# Patient Record
Sex: FEMALE | Race: WHITE | NOT HISPANIC OR LATINO | Employment: OTHER | URBAN - NONMETROPOLITAN AREA
[De-identification: names, ages, dates, MRNs, and addresses within clinical notes are randomized per-mention and may not be internally consistent; named-entity substitution may affect disease eponyms.]

---

## 2018-11-13 ENCOUNTER — DOCUMENTATION (OUTPATIENT)
Dept: CARDIAC REHAB | Facility: HOSPITAL | Age: 78
End: 2018-11-13

## 2019-09-04 ENCOUNTER — CONSULT (OUTPATIENT)
Dept: SURGERY | Facility: CLINIC | Age: 79
End: 2019-09-04

## 2019-09-04 VITALS
HEIGHT: 65 IN | BODY MASS INDEX: 29.66 KG/M2 | WEIGHT: 178 LBS | HEART RATE: 60 BPM | SYSTOLIC BLOOD PRESSURE: 132 MMHG | TEMPERATURE: 97.9 F | DIASTOLIC BLOOD PRESSURE: 70 MMHG

## 2019-09-04 DIAGNOSIS — C44.90 SKIN CANCER: Primary | ICD-10-CM

## 2019-09-04 PROBLEM — H90.A21 SENSORINEURAL HEARING LOSS (SNHL) OF RIGHT EAR WITH RESTRICTED HEARING OF LEFT EAR: Status: ACTIVE | Noted: 2018-04-27

## 2019-09-04 PROBLEM — I24.89: Status: ACTIVE | Noted: 2019-02-28

## 2019-09-04 PROBLEM — N13.2 HYDRONEPHROSIS WITH URINARY OBSTRUCTION DUE TO URETERAL CALCULUS: Status: ACTIVE | Noted: 2018-06-16

## 2019-09-04 PROBLEM — L72.0 MILIA: Status: ACTIVE | Noted: 2019-01-03

## 2019-09-04 PROBLEM — I24.8: Status: ACTIVE | Noted: 2019-02-28

## 2019-09-04 PROBLEM — Z86.007 HISTORY OF SQUAMOUS CELL CARCINOMA IN SITU: Status: ACTIVE | Noted: 2019-02-21

## 2019-09-04 PROBLEM — I21.4 NSTEMI (NON-ST ELEVATED MYOCARDIAL INFARCTION) (HCC): Status: ACTIVE | Noted: 2018-10-30

## 2019-09-04 PROBLEM — I25.9 CHRONIC ISCHEMIC HEART DISEASE, UNSPECIFIED: Status: ACTIVE | Noted: 2019-02-28

## 2019-09-04 PROBLEM — I45.5 OTHER SPECIFIED HEART BLOCK: Status: ACTIVE | Noted: 2019-02-28

## 2019-09-04 PROBLEM — Z95.1 PRESENCE OF AORTOCORONARY BYPASS GRAFT: Status: ACTIVE | Noted: 2019-02-28

## 2019-09-04 PROBLEM — Z95.0 PRESENCE OF CARDIAC PACEMAKER: Status: ACTIVE | Noted: 2018-12-03

## 2019-09-04 PROBLEM — L28.1 PICKER'S PAPULE: Status: ACTIVE | Noted: 2019-01-03

## 2019-09-04 PROBLEM — R59.0 MEDIASTINAL ADENOPATHY: Status: ACTIVE | Noted: 2018-06-17

## 2019-09-04 PROCEDURE — 99203 OFFICE O/P NEW LOW 30 MIN: CPT | Performed by: SURGERY

## 2019-09-04 RX ORDER — METOPROLOL SUCCINATE 50 MG/1
TABLET, EXTENDED RELEASE ORAL
COMMUNITY
Start: 2019-08-14

## 2019-09-04 RX ORDER — OMEPRAZOLE 20 MG/1
20 CAPSULE, DELAYED RELEASE ORAL
Refills: 0 | COMMUNITY
Start: 2019-08-14

## 2019-09-04 RX ORDER — INSULIN ASPART 100 [IU]/ML
INJECTION, SOLUTION INTRAVENOUS; SUBCUTANEOUS
Refills: 1 | COMMUNITY
Start: 2019-08-14

## 2019-09-04 RX ORDER — LOSARTAN POTASSIUM 50 MG/1
TABLET ORAL
Refills: 0 | COMMUNITY
Start: 2019-08-14

## 2019-09-04 RX ORDER — TICAGRELOR 90 MG/1
90 TABLET ORAL 2 TIMES DAILY
Refills: 1 | COMMUNITY
Start: 2019-08-14

## 2019-09-04 RX ORDER — CITALOPRAM 40 MG/1
TABLET ORAL
Refills: 2 | COMMUNITY
Start: 2019-08-14

## 2019-09-04 RX ORDER — ASPIRIN 81 MG/1
81 TABLET, CHEWABLE ORAL DAILY
COMMUNITY

## 2019-09-04 RX ORDER — NITROGLYCERIN 0.4 MG/1
0.4 TABLET SUBLINGUAL
COMMUNITY
Start: 2017-03-02

## 2019-09-04 RX ORDER — DOXYCYCLINE 100 MG/1
CAPSULE ORAL
Refills: 0 | COMMUNITY
Start: 2019-08-28 | End: 2022-07-21

## 2019-09-04 RX ORDER — ISOSORBIDE MONONITRATE 60 MG/1
60 TABLET, EXTENDED RELEASE ORAL EVERY MORNING
Refills: 11 | COMMUNITY
Start: 2019-08-16

## 2019-09-04 RX ORDER — INSULIN DETEMIR 100 [IU]/ML
70 INJECTION, SOLUTION SUBCUTANEOUS
Refills: 1 | COMMUNITY
Start: 2019-08-14

## 2019-09-04 RX ORDER — ATORVASTATIN CALCIUM 40 MG/1
TABLET, FILM COATED ORAL
Refills: 1 | COMMUNITY
Start: 2019-08-14

## 2019-09-04 RX ORDER — ROPINIROLE 0.5 MG/1
TABLET, FILM COATED ORAL
Refills: 4 | COMMUNITY
Start: 2019-07-17

## 2019-09-04 RX ORDER — METHIMAZOLE 5 MG/1
TABLET ORAL
COMMUNITY
Start: 2019-08-14

## 2019-09-04 NOTE — PROGRESS NOTES
Subjective   Nicole Webb is a 78 y.o. 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  Patient also states she underwent biopsies of lesions on the inside of her face but there is no pathology report provided.  Patient denies having skin cancers in the past.  She is on Brilinta for history of stent placement in her heart.    Review of Systems   Constitutional: Negative.    Eyes: Negative.    Respiratory: Negative.    Cardiovascular: Negative.    Gastrointestinal: Negative.    Endocrine: Negative.    Genitourinary: Negative.    Musculoskeletal: Positive for arthralgias and back pain.   Skin:        Squamous cell anterior neck & Lesion forehead   Allergic/Immunologic: Negative.    Neurological: Positive for numbness and headaches.        Numbness & Tingling Bilateral Feet   Hematological: Negative.    Psychiatric/Behavioral: Negative.      Past Medical History:   Diagnosis Date   • CHF (congestive heart failure) (CMS/HCC)    • Diabetes (CMS/HCC)    • Hypertension      Past Surgical History:   Procedure Laterality Date   • APPENDECTOMY     • CHOLECYSTECTOMY OPEN     • CORONARY ANGIOPLASTY     • CORONARY ARTERY BYPASS GRAFT     • KIDNEY STONE SURGERY     • PACEMAKER IMPLANTATION     • SHOULDER SURGERY Right      Family History   Problem Relation Age of Onset   • Heart disease Father    • Diabetes Daughter      Social History     Socioeconomic History   • Marital status:      Spouse name: Not on file   • Number of children: Not on file   • Years of education: Not on file   • Highest education level: Not on file   Tobacco Use   • Smoking  status: Former Smoker     Last attempt to quit: 1979     Years since quittin.0   • Smokeless tobacco: Never Used   Substance and Sexual Activity   • Alcohol use: No     Frequency: Never     Allergies   Allergen Reactions   • Codeine Diarrhea and Nausea And Vomiting   • Lisinopril Cough     Vitals:    19 0954   BP: 132/70   Pulse: 60   Temp: 97.9 °F (36.6 °C)       Home Medications:  Prior to Admission medications    Medication Sig Start Date End Date Taking? Authorizing Provider   aspirin 81 MG chewable tablet Chew 81 mg Daily.   Yes Baldo Curry MD   atorvastatin (LIPITOR) 40 MG tablet TAKE ONE TABLET BY MOUTH ONCE DAILY 19  Yes Baldo Curry MD   BRILINTA 90 MG tablet tablet Take 90 mg by mouth 2 (Two) Times a Day. 19  Yes Baldo Curry MD   citalopram (CeleXA) 40 MG tablet TAKE ONE TABLET BY MOUTH ONCE DAILY 19  Yes Baldo Curyr MD   doxycycline (MONODOX) 100 MG capsule TAKE ONE CAPSULE BY MOUTH TWO TIMES A DAY FOR 21 DAYS 19  Yes Baldo Curry MD   isosorbide mononitrate (IMDUR) 60 MG 24 hr tablet Take 60 mg by mouth Every Morning. 19  Yes Baldo Curry MD   LEVEMIR FLEXTOUCH 100 UNIT/ML injection Inject 70 Units under the skin into the appropriate area as directed every night at bedtime. 19  Yes Baldo Curry MD   losartan (COZAAR) 50 MG tablet TAKE ONE TABLET BY MOUTH ONCE DAILY 19  Yes ProviderBaldo MD   metFORMIN (GLUCOPHAGE) 500 MG tablet  19  Yes ProviderBaldo MD   methIMAzole (TAPAZOLE) 5 MG tablet  19  Yes ProviderBaldo MD   metoprolol succinate XL (TOPROL-XL) 50 MG 24 hr tablet  19  Yes Baldo Curry MD   nitroglycerin (NITROSTAT) 0.4 MG SL tablet Place 0.4 mg under the tongue. 3/2/17  Yes Badlo Curry MD   NOVOLOG FLEXPEN 100 UNIT/ML solution pen-injector sc pen INJECT 20 UNITS SUBCUTANEOUSLY BEFORE MEALS 19  Yes ProviderBaldo  MD   omeprazole (priLOSEC) 20 MG capsule Take 20 mg by mouth Before Breakfast. 8/14/19  Yes Provider, MD Baldo   rOPINIRole (REQUIP) 0.5 MG tablet TAKE ONE TABLET BY MOUTH 1 TO 3 HOURS BEFORE BEDTIME 7/17/19  Yes Provider, Historical, MD       Objective   Physical Exam   Constitutional: She is oriented to person, place, and time. She appears well-developed and well-nourished. No distress.   HENT:   Head: Normocephalic and atraumatic.   Nose: Nose normal.   Eyes: Conjunctivae are normal.   Neck: Normal range of motion. No tracheal deviation present. No thyromegaly present.   Cardiovascular: Normal rate, regular rhythm and normal heart sounds.   No murmur heard.  Pulmonary/Chest: Effort normal and breath sounds normal. No respiratory distress. She has no wheezes. She has no rales. She exhibits no tenderness.   Abdominal: Soft. She exhibits no distension. There is no tenderness. There is no rebound and no guarding. No hernia.   Musculoskeletal: She exhibits no tenderness or deformity.   Neurological: She is alert and oriented to person, place, and time.   Skin: Skin is warm and dry. No rash noted.   Psychiatric: She has a normal mood and affect. Her behavior is normal. Judgment and thought content normal.   Vitals reviewed.  6 x 9 mm wound consistent with a shave biopsy midportion of her forehead appears to be healing and a 7 mm diameter superficial wound consistent with a shave biopsy on left lower anterior neck.  No redness or fluctuance associated either site.  No palpable adenopathy.  Smaller biopsy sites on each side of her face.    Assessment/Plan   According to the path report the forehead lesion did not think needs to have surgery at this point.  The lower anterior neck lesion should be reexcised and we discussed doing that in the office of the operating room I think she can stay on her Brilinta for this procedure.  Regarding the other 2 lesions she is going to be checking back with the dermatologist but  I do not have the pathology reports from the office.  There was some question that the patient was told that she had a basal cell cancer on her forehead but that is not what the pathology she informs us.  We will get her in to see dermatology and will also get her set up to have the lower anterior neck squamous cell carcinoma in situ lesion reexcised.      The encounter diagnosis was Skin cancer.                     This document has been electronically signed by Jose Orozco MD on September 4, 2019 1:49 PM

## 2019-09-04 NOTE — PATIENT INSTRUCTIONS

## 2019-11-20 ENCOUNTER — OFFICE VISIT (OUTPATIENT)
Dept: OTOLARYNGOLOGY | Facility: CLINIC | Age: 79
End: 2019-11-20

## 2019-11-20 VITALS — HEIGHT: 65 IN | OXYGEN SATURATION: 96 % | BODY MASS INDEX: 30.09 KG/M2 | WEIGHT: 180.6 LBS

## 2019-11-20 DIAGNOSIS — L28.0 NEURODERMATITIS: ICD-10-CM

## 2019-11-20 DIAGNOSIS — D04.9 SQUAMOUS CELL CARCINOMA IN SITU (SCCIS) OF SKIN: Primary | ICD-10-CM

## 2019-11-20 PROCEDURE — 17261 DSTRJ MAL LES T/A/L .6-1.0CM: CPT | Performed by: OTOLARYNGOLOGY

## 2019-11-20 PROCEDURE — 99203 OFFICE O/P NEW LOW 30 MIN: CPT | Performed by: OTOLARYNGOLOGY

## 2019-11-20 RX ORDER — INFLUENZA A VIRUS A/MICHIGAN/45/2015 X-275 (H1N1) ANTIGEN (FORMALDEHYDE INACTIVATED), INFLUENZA A VIRUS A/SINGAPORE/INFIMH-16-0019/2016 IVR-186 (H3N2) ANTIGEN (FORMALDEHYDE INACTIVATED), AND INFLUENZA B VIRUS B/MARYLAND/15/2016 BX-69A (A B/COLORADO/6/2017-LIKE VIRUS) ANTIGEN (FORMALDEHYDE INACTIVATED) 60; 60; 60 UG/.5ML; UG/.5ML; UG/.5ML
INJECTION, SUSPENSION INTRAMUSCULAR
Refills: 0 | COMMUNITY
Start: 2019-10-17

## 2019-11-20 RX ORDER — GABAPENTIN 300 MG/1
300 CAPSULE ORAL
COMMUNITY
Start: 2019-11-15 | End: 2020-12-16

## 2019-11-20 RX ORDER — FLASH GLUCOSE SENSOR
1 KIT MISCELLANEOUS
COMMUNITY
Start: 2019-10-15 | End: 2020-11-16

## 2019-11-20 NOTE — PROGRESS NOTES
"Subjective   Nicole Webb is a 79 y.o. female.   Consultation from MAUREEN Cam  History of Present Illness   Patient had a lesion on her anterior neck that is been present for a few months.  Was not painful or bleeding.  She has chronically on antiplatelet therapy with Brilinta.  Underwent shave biopsy.  Pathology report is available and was consistent with squamous cell carcinoma in situ.  Patient also has a lesion of her mid forehead that is been present several months.  She says it started when a \"hard shell bug\" flew into her forehead.  She admits to digitally traumatizing it but states it just will not heal.      The following portions of the patient's history were reviewed and updated as appropriate: allergies, current medications, past family history, past medical history, past social history, past surgical history and problem list.      Nicole Webb reports that she quit smoking about 40 years ago. She has never used smokeless tobacco. She reports that she does not drink alcohol.  Patient is not a tobacco user and has not been counseled for use of tobacco products    Family History   Problem Relation Age of Onset   • Heart disease Father    • Diabetes Daughter        Allergies   Allergen Reactions   • Codeine Diarrhea and Nausea And Vomiting   • Lisinopril Cough         Current Outpatient Medications:   •  aspirin 81 MG chewable tablet, Chew 81 mg Daily., Disp: , Rfl:   •  atorvastatin (LIPITOR) 40 MG tablet, TAKE ONE TABLET BY MOUTH ONCE DAILY, Disp: , Rfl: 1  •  BRILINTA 90 MG tablet tablet, Take 90 mg by mouth 2 (Two) Times a Day., Disp: , Rfl: 1  •  citalopram (CeleXA) 40 MG tablet, TAKE ONE TABLET BY MOUTH ONCE DAILY, Disp: , Rfl: 2  •  Continuous Blood Gluc Sensor (FREESTYLE GERALDO 14 DAY SENSOR) [facility], 1 each., Disp: , Rfl:   •  doxycycline (MONODOX) 100 MG capsule, TAKE ONE CAPSULE BY MOUTH TWO TIMES A DAY FOR 21 DAYS, Disp: , Rfl: 0  •  gabapentin (NEURONTIN) 300 MG capsule, " Take 300 mg by mouth., Disp: , Rfl:   •  isosorbide mononitrate (IMDUR) 60 MG 24 hr tablet, Take 60 mg by mouth Every Morning., Disp: , Rfl: 11  •  LEVEMIR FLEXTOUCH 100 UNIT/ML injection, Inject 70 Units under the skin into the appropriate area as directed every night at bedtime., Disp: , Rfl: 1  •  losartan (COZAAR) 50 MG tablet, TAKE ONE TABLET BY MOUTH ONCE DAILY, Disp: , Rfl: 0  •  metFORMIN (GLUCOPHAGE) 500 MG tablet, , Disp: , Rfl:   •  methIMAzole (TAPAZOLE) 5 MG tablet, , Disp: , Rfl:   •  metoprolol succinate XL (TOPROL-XL) 50 MG 24 hr tablet, , Disp: , Rfl:   •  nitroglycerin (NITROSTAT) 0.4 MG SL tablet, Place 0.4 mg under the tongue., Disp: , Rfl:   •  NOVOLOG FLEXPEN 100 UNIT/ML solution pen-injector sc pen, INJECT 20 UNITS SUBCUTANEOUSLY BEFORE MEALS, Disp: , Rfl: 1  •  omeprazole (priLOSEC) 20 MG capsule, Take 20 mg by mouth Before Breakfast., Disp: , Rfl: 0  •  rOPINIRole (REQUIP) 0.5 MG tablet, TAKE ONE TABLET BY MOUTH 1 TO 3 HOURS BEFORE BEDTIME, Disp: , Rfl: 4  •  FLUZONE HIGH-DOSE 0.5 ML suspension prefilled syringe injection, TO BE ADMINISTERED BY PHARMACIST FOR IMMUNIZATION, Disp: , Rfl: 0  •  mupirocin (BACTROBAN) 2 % ointment, Apply to forehead lesion and cover with bandage Apply to neck lesion twice a day, Disp: 15 g, Rfl: 1    Past Medical History:   Diagnosis Date   • CHF (congestive heart failure) (CMS/HCC)    • Diabetes (CMS/HCC)    • Hypertension    • Skin cancer        Past Surgical History:   Procedure Laterality Date   • APPENDECTOMY     • CHOLECYSTECTOMY OPEN     • CORONARY ANGIOPLASTY     • CORONARY ARTERY BYPASS GRAFT     • KIDNEY STONE SURGERY     • PACEMAKER IMPLANTATION     • SHOULDER SURGERY Right          Review of Systems   HENT: Positive for hearing loss.    Skin: Positive for wound.   All other systems reviewed and are negative.          Objective   Physical Exam  General: Well-developed well-nourished female in no acute distress.  Alert and oriented x-3. Head:  Normocephalic. Face: Symmetrical strength and appearance. PERRL. EOMI. Voice:Strong. Speech:Fluent  Ears: External ears no deformity, canals no discharge, tympanic membranes intact clear and mobile bilaterally.  Nose: Nares show no discharge mass polyp or purulence.  Boggy mucosa is present.  No gross external deformity.  Septum: Midline  Oral cavity: Lips and gums without lesions.  Tongue and floor of mouth without lesions.  Parotid and submandibular ducts unobstructed.  No mucosal lesions on the buccal mucosa or vestibule of the mouth.  Pharynx: No erythema exudate mass or ulcer  Neck: No lymphadenopathy.  No thyromegaly.  Trachea and larynx midline.  No masses in the parotid or submandibular glands.  skin: The anterior neck shows a biopsy site measuring approximately 1 cm in greatest diameter.  There is one small scale present that measures no more than 1 mm otherwise this appears well epithelialized.  In the mid forehead just above the glabella is a 0.8 cm crust with no associated neoplasm.  This is consistent with neurodermatitis.        Assessment/Plan   Nicole was seen today for skin lesion.    Diagnoses and all orders for this visit:    Squamous cell carcinoma in situ (SCCIS) of skin    Neurodermatitis    Other orders  -     Discontinue: mupirocin (BACTROBAN) 2 % ointment; Apply to forehead lesion and cover with bandage  Apply to neck lesion twice a day.  -     mupirocin (BACTROBAN) 2 % ointment; Apply to forehead lesion and cover with bandage Apply to neck lesion twice a day      Plan: Recommended cryotherapy to the biopsy site.  I do not think definitive excision is warranted at this point.  I explained cryotherapy to her as well as expected posttreatment course and wound care and she was agreeable and 4 cycles of freeze thaw therapy are applied.  Also recommended applying mupirocin to the area of neurodermatitis and covering it with an advanced healing bandage which will be left in place for at least 10  days to 2 weeks.  She will see me again in a month.    My thanks to Ms. Zhang for this consultation

## 2019-12-11 ENCOUNTER — HOSPITAL ENCOUNTER (EMERGENCY)
Facility: HOSPITAL | Age: 79
Discharge: HOME | End: 2019-12-11
Attending: EMERGENCY MEDICINE | Admitting: EMERGENCY MEDICINE

## 2019-12-11 ENCOUNTER — APPOINTMENT (OUTPATIENT)
Dept: CT IMAGING | Facility: HOSPITAL | Age: 79
End: 2019-12-11

## 2019-12-11 VITALS
DIASTOLIC BLOOD PRESSURE: 112 MMHG | HEIGHT: 65 IN | SYSTOLIC BLOOD PRESSURE: 179 MMHG | BODY MASS INDEX: 30.07 KG/M2 | HEART RATE: 62 BPM | RESPIRATION RATE: 18 BRPM | TEMPERATURE: 97.7 F | OXYGEN SATURATION: 97 % | WEIGHT: 180.5 LBS

## 2019-12-11 DIAGNOSIS — S00.03XA CONTUSION OF SCALP, INITIAL ENCOUNTER: Primary | ICD-10-CM
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
## 2019-12-11 PROCEDURE — 70450 CT HEAD/BRAIN W/O DYE: CPT

## 2019-12-11 PROCEDURE — 99283 EMERGENCY DEPT VISIT LOW MDM: CPT

## 2019-12-11 PROCEDURE — 72125 CT NECK SPINE W/O DYE: CPT

## 2019-12-11 RX ORDER — HYDROCODONE BITARTRATE AND ACETAMINOPHEN 7.5; 325 MG/1; MG/1
1 TABLET ORAL ONCE
Status: COMPLETED | OUTPATIENT
Start: 2019-12-11 | End: 2019-12-11

## 2019-12-11 RX ADMIN — HYDROCODONE BITARTRATE AND ACETAMINOPHEN 1 TABLET: 7.5; 325 TABLET ORAL at 16:02

## 2019-12-11 NOTE — ED PROVIDER NOTES
Subjective   79 years old female with history of hypertension, hyperlipidemia, congestive heart failure presented in the ER with chief complaint of fall and right sided head injury.  Patient reports missing step while coming downstairs leading to stumble and fall hitting her right head against the concrete prior to arrival.  No loss of consciousness.  She has a swelling/bruise/hematoma with dull aching moderate intensity sharp pain along the right side which is more with palpation and better with being still.  She denies injury anywhere else.  Denies dizziness lightheadedness, blurry vision, numbness tingling or focal weakness.  She has chronic back pain status post surgical intervention and seems to be a little more flareup after today's fall.  Patient denies hitting her back.      History provided by:  Patient      Review of Systems   Constitutional: Negative for chills and fever.   HENT: Negative for congestion, nosebleeds, rhinorrhea, sinus pressure and voice change.    Respiratory: Negative for chest tightness and shortness of breath.    Cardiovascular: Negative for chest pain and palpitations.   Gastrointestinal: Negative for abdominal pain, nausea and vomiting.   Genitourinary: Negative for flank pain.   Musculoskeletal: Positive for back pain.   Skin: Negative for color change.   Neurological: Positive for headaches. Negative for dizziness, tremors, syncope and speech difficulty.   Psychiatric/Behavioral: Negative for agitation.       Past Medical History:   Diagnosis Date   • CHF (congestive heart failure) (CMS/HCC)    • Diabetes (CMS/HCC)    • Hypertension    • Skin cancer        Allergies   Allergen Reactions   • Codeine Diarrhea and Nausea And Vomiting   • Lisinopril Cough       Past Surgical History:   Procedure Laterality Date   • APPENDECTOMY     • CHOLECYSTECTOMY OPEN     • CORONARY ANGIOPLASTY     • CORONARY ARTERY BYPASS GRAFT     • KIDNEY STONE SURGERY     • PACEMAKER IMPLANTATION     • SHOULDER  SURGERY Right        Family History   Problem Relation Age of Onset   • Heart disease Father    • Diabetes Daughter        Social History     Socioeconomic History   • Marital status:      Spouse name: Not on file   • Number of children: Not on file   • Years of education: Not on file   • Highest education level: Not on file   Tobacco Use   • Smoking status: Former Smoker     Last attempt to quit: 1979     Years since quittin.2   • Smokeless tobacco: Never Used   Substance and Sexual Activity   • Alcohol use: No     Frequency: Never           Objective   Physical Exam   Constitutional: She is oriented to person, place, and time. She appears well-developed and well-nourished.   HENT:   Head: Normocephalic. Head is with contusion. Head is without Beck's sign, without abrasion, without laceration, without right periorbital erythema and without left periorbital erythema.       Right Ear: External ear normal.   Left Ear: External ear normal.   Nose: Nose normal.   Mouth/Throat: Oropharynx is clear and moist.   Eyes: Pupils are equal, round, and reactive to light. Conjunctivae and EOM are normal.   Neck: Normal range of motion. Neck supple.   Cardiovascular: Normal rate, regular rhythm and normal heart sounds.   Pulmonary/Chest: Effort normal and breath sounds normal.   Musculoskeletal: Normal range of motion. She exhibits tenderness.        Lumbar back: She exhibits tenderness, bony tenderness, pain and spasm.        Back:    Neurological: She is alert and oriented to person, place, and time.   Skin: Skin is warm and dry. Capillary refill takes less than 2 seconds.   Psychiatric: She has a normal mood and affect.   Nursing note and vitals reviewed.      Procedures           ED Course                      No data recorded                        MDM  Number of Diagnoses or Management Options  Diagnosis management comments: 79 years old is evaluated after fall and head injury.  No loss of consciousness.   GCS 15 / 15.  Ruled out acute intracranial findings.  Negative CT head.  Patient is feeling better after pain medication while in the ER.  No injury anywhere else.  Do not think needs any further work-up and is stable for discharge with outpatient follow-up.  Recommended taking Tylenol as needed.       Amount and/or Complexity of Data Reviewed  Tests in the radiology section of CPT®: ordered and reviewed    Labs Reviewed - No data to display    Ct Head Without Contrast    Result Date: 12/11/2019  Narrative: CT Head Without Contrast History: Headache. Trauma. Fell hitting back of head. Axial scans of the brain were obtained without intravenous contrast.  Coronal and sagital reconstructions were preformed. This exam was performed according to our departmental dose-optimization program, which includes automated exposure control, adjustment of the mA and/or kV according to patient size and/or use of iterative reconstruction technique. DLP: 955.30 Comparison: None Findings: Bone windows are unremarkable. The visualized paranasal sinuses are unremarkable. High right parietal scalp hematoma. No intracranial injury or acute process. Cerebral atrophy. 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 No hemorrhage. No mass. No abnormal areas of increased attenuation. No midline shift. No abnormal extra-axial fluid collections.     Impression: CONCLUSION: High right parietal scalp hematoma. No intracranial injury or acute process. Cerebral atrophy. 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 [id number] Electronically signed by:  Ward Figueroa MD  12/11/2019 2:40 PM CST Workstation: [id number]    Ct Cervical Spine Without Contrast    Result Date: 12/11/2019  Narrative: PROCEDURE: CT cervical spine without contrast COMPARISON: No comparison HISTORY: Injury, pain TECHNIQUE: Multiple contiguous noncontrast axial images are obtained of the cervical spine. Coronal and sagittal multiplanar reformatted images are also reviewed. This exam was  performed using radiation doses that are as low as reasonably achievable (ALARA). This exam was performed according to our departmental dose optimization program, which includes automated exposure control, adjustment of the mA and/or KV according to patient size and/or use of iterative reconstruction technique. FINDINGS: Spiculated nodule left lung apex measuring 8 x 6 mm. There are degenerative changes of the cervical spine at C5-C6 and C6-C7 manifested by disc space narrowing osteophyte formation. This includes a prominent posterior osteophyte at C5-C6 and a smaller one at C6-C7. There is hypertrophy of the uncovertebral joints in the lower cervical spine. There is facet joint hypertrophy in the mid to lower cervical spine. No CT evidence of acute fracture is seen. A partially visualized cardiac pacer is present in the left chest. Enlarged thyroid gland which appears heterogeneous with calcifications.     Impression: CONCLUSION:  Degenerative changes. No acute fracture or subluxation of the cervical spine. Enlarged thyroid gland which appears heterogeneous with calcifications. Electronically signed by:  Jhonathan Aleman MD  12/11/2019 4:34 PM CST Workstation: [id number]          Final diagnoses:   Contusion of scalp, initial encounter   Fall, initial encounter              Tj Lozano MD  12/11/19 0530

## 2019-12-11 NOTE — DISCHARGE INSTRUCTIONS
Take Tylenol as needed.  Follow-up with primary care physician for reevaluation.  Return to ER for any worsening.  Stay with a responsible person for next 48 hours with frequent neurochecks.

## 2019-12-17 ENCOUNTER — APPOINTMENT (OUTPATIENT)
Dept: ULTRASOUND IMAGING | Facility: HOSPITAL | Age: 79
End: 2019-12-17

## 2019-12-23 ENCOUNTER — HOSPITAL ENCOUNTER (OUTPATIENT)
Dept: ULTRASOUND IMAGING | Facility: HOSPITAL | Age: 79
Discharge: HOME | End: 2019-12-23
Admitting: NURSE PRACTITIONER

## 2019-12-23 DIAGNOSIS — N18.3 TYPE 2 DIABETES MELLITUS WITH STAGE 3 CHRONIC KIDNEY DISEASE, UNSPECIFIED WHETHER LONG TERM INSULIN USE: ICD-10-CM

## 2019-12-23 DIAGNOSIS — N18.30 CHRONIC KIDNEY DISEASE, STAGE III (MODERATE) (HCC): ICD-10-CM

## 2019-12-23 DIAGNOSIS — E78.5 HYPERLIPIDEMIA, UNSPECIFIED HYPERLIPIDEMIA TYPE: ICD-10-CM

## 2019-12-23 DIAGNOSIS — I10 HYPERTENSION, ESSENTIAL: ICD-10-CM

## 2019-12-23 DIAGNOSIS — E05.90 PRIMARY HYPERTHYROIDISM: ICD-10-CM

## 2019-12-23 DIAGNOSIS — E11.22 TYPE 2 DIABETES MELLITUS WITH STAGE 3 CHRONIC KIDNEY DISEASE, UNSPECIFIED WHETHER LONG TERM INSULIN USE: ICD-10-CM

## 2019-12-23 PROCEDURE — 76775 US EXAM ABDO BACK WALL LIM: CPT

## 2019-12-30 ENCOUNTER — OFFICE VISIT (OUTPATIENT)
Dept: OTOLARYNGOLOGY | Facility: CLINIC | Age: 79
End: 2019-12-30

## 2019-12-30 VITALS — BODY MASS INDEX: 29.29 KG/M2 | WEIGHT: 175.8 LBS | HEIGHT: 65 IN | TEMPERATURE: 97.2 F

## 2019-12-30 DIAGNOSIS — Z48.817 AFTERCARE FOLLOWING SURGERY OF THE SKIN OR SUBCUTANEOUS TISSUE: Primary | ICD-10-CM

## 2019-12-30 PROCEDURE — 99212 OFFICE O/P EST SF 10 MIN: CPT | Performed by: OTOLARYNGOLOGY

## 2019-12-30 NOTE — PROGRESS NOTES
Subjective   Nicole Webb is a 79 y.o. female.       History of Present Illness   Patient was seen previously with a biopsied lesion of the anterior neck consistent with squamous cell carcinoma in situ.  Also had a lesion of her forehead thought to be neurodermatitis.  The patient is on chronic antiplatelet therapy and it was felt that her neck lesion could just be treated with cryotherapy and her forehead lesion treated with an occlusive bandage to prevent manipulation.  Patient reports both areas have healed up nicely.      The following portions of the patient's history were reviewed and updated as appropriate: allergies, current medications, past family history, past medical history, past social history, past surgical history and problem list.     reports that she quit smoking about 40 years ago. She has never used smokeless tobacco. She reports that she does not drink alcohol.   Patient is not a tobacco user and has not been counseled for use of tobacco products      Review of Systems        Objective   Physical Exam  Skin: Previous abnormal area of the forehead has completely epithelialized with no residual.  The previous biopsy site on the anterior neck is now well epithelialized with no crusting or scaling.      Assessment/Plan   Nicole was seen today for skin lesion.    Diagnoses and all orders for this visit:    Aftercare following surgery of the skin or subcutaneous tissue        Plan: Reassurance to the patient.  I do not think any additional treatment is indicated at this point.  May follow-up with me as needed.

## 2020-10-06 ENCOUNTER — TRANSCRIBE ORDERS (OUTPATIENT)
Dept: PODIATRY | Facility: CLINIC | Age: 80
End: 2020-10-06

## 2020-10-06 DIAGNOSIS — S90.222A TOENAIL BRUISE, LEFT, INITIAL ENCOUNTER: Primary | ICD-10-CM

## 2020-10-13 ENCOUNTER — OFFICE VISIT (OUTPATIENT)
Dept: PODIATRY | Facility: CLINIC | Age: 80
End: 2020-10-13

## 2020-10-13 VITALS — HEIGHT: 65 IN | HEART RATE: 71 BPM | WEIGHT: 175.8 LBS | BODY MASS INDEX: 29.29 KG/M2 | OXYGEN SATURATION: 96 %

## 2020-10-13 DIAGNOSIS — E11.42 DIABETIC POLYNEUROPATHY ASSOCIATED WITH TYPE 2 DIABETES MELLITUS (HCC): ICD-10-CM

## 2020-10-13 DIAGNOSIS — M79.675 PAIN IN TOES OF BOTH FEET: ICD-10-CM

## 2020-10-13 DIAGNOSIS — M79.674 PAIN IN TOES OF BOTH FEET: ICD-10-CM

## 2020-10-13 DIAGNOSIS — E11.9 ENCOUNTER FOR DIABETIC FOOT EXAM (HCC): Primary | ICD-10-CM

## 2020-10-13 DIAGNOSIS — S90.221A SUBUNGUAL HEMATOMA OF TOE OF RIGHT FOOT, INITIAL ENCOUNTER: ICD-10-CM

## 2020-10-13 DIAGNOSIS — B35.1 ONYCHOMYCOSIS: ICD-10-CM

## 2020-10-13 PROCEDURE — 99203 OFFICE O/P NEW LOW 30 MIN: CPT | Performed by: PODIATRIST

## 2020-10-13 PROCEDURE — 11721 DEBRIDE NAIL 6 OR MORE: CPT | Performed by: PODIATRIST

## 2020-10-13 NOTE — PROGRESS NOTES
Nicole Webb  1940  80 y.o. female   PCP: MAUREEN Chou 06/06/2020  BS: 156 this morning per patient    Patient presents to clinic today with complaint of a black right 2nd toenail. Patient also mentioned some pain in the top of her left foot.    10/13/2020  Chief Complaint   Patient presents with   • Left Foot - Pain   • Right Foot - Nail Problem           History of Present Illness    Nicole Webb is a 80 y.o. female who presents for diabetic foot exam and nail issues on the right foot.  Patient states that over the past month she has noticed dark discoloration to her right first and second toenails.  She is uncertain when this began but feels that it may be due to ill fitting shoes.  She denies any trauma or injuries.  She denies any specific pain to this area.  There is no associated redness or drainage.  She does also note tingling and burning sensation especially in the evenings.  She is taking gabapentin and Requip for restless leg and neuropathy.  She feels these help her symptoms only slightly.  She denies any other pedal complaints today.      Past Medical History:   Diagnosis Date   • CHF (congestive heart failure) (CMS/HCC)    • Diabetes (CMS/HCC)    • Hypertension    • Skin cancer          Past Surgical History:   Procedure Laterality Date   • APPENDECTOMY     • CHOLECYSTECTOMY OPEN     • CORONARY ANGIOPLASTY     • CORONARY ARTERY BYPASS GRAFT     • KIDNEY STONE SURGERY     • PACEMAKER IMPLANTATION     • SHOULDER SURGERY Right          Family History   Problem Relation Age of Onset   • Heart disease Father    • Hypertension Father    • Diabetes Daughter    • Clotting disorder Mother    • Diabetes Maternal Grandmother    • Clotting disorder Other          Social History     Socioeconomic History   • Marital status:      Spouse name: Not on file   • Number of children: Not on file   • Years of education: Not on file   • Highest education level: Not on file   Tobacco Use      • Smoking status: Former Smoker     Quit date: 1979     Years since quittin.1   • Smokeless tobacco: Never Used   Substance and Sexual Activity   • Alcohol use: No     Frequency: Never         Current Outpatient Medications   Medication Sig Dispense Refill   • aspirin 81 MG chewable tablet Chew 81 mg Daily.     • atorvastatin (LIPITOR) 40 MG tablet TAKE ONE TABLET BY MOUTH ONCE DAILY  1   • BRILINTA 90 MG tablet tablet Take 90 mg by mouth 2 (Two) Times a Day.  1   • citalopram (CeleXA) 40 MG tablet TAKE ONE TABLET BY MOUTH ONCE DAILY  2   • Continuous Blood Gluc Sensor (FREESTYLE GERALDO 14 DAY SENSOR) [facility] 1 each.     • doxycycline (MONODOX) 100 MG capsule TAKE ONE CAPSULE BY MOUTH TWO TIMES A DAY FOR 21 DAYS  0   • FLUZONE HIGH-DOSE 0.5 ML suspension prefilled syringe injection TO BE ADMINISTERED BY PHARMACIST FOR IMMUNIZATION  0   • gabapentin (NEURONTIN) 300 MG capsule Take 300 mg by mouth.     • isosorbide mononitrate (IMDUR) 60 MG 24 hr tablet Take 60 mg by mouth Every Morning.  11   • LEVEMIR FLEXTOUCH 100 UNIT/ML injection Inject 70 Units under the skin into the appropriate area as directed every night at bedtime.  1   • losartan (COZAAR) 50 MG tablet TAKE ONE TABLET BY MOUTH ONCE DAILY  0   • metFORMIN (GLUCOPHAGE) 500 MG tablet      • methIMAzole (TAPAZOLE) 5 MG tablet      • metoprolol succinate XL (TOPROL-XL) 50 MG 24 hr tablet      • mupirocin (BACTROBAN) 2 % ointment Apply to forehead lesion and cover with bandage Apply to neck lesion twice a day 15 g 1   • nitroglycerin (NITROSTAT) 0.4 MG SL tablet Place 0.4 mg under the tongue.     • NOVOLOG FLEXPEN 100 UNIT/ML solution pen-injector sc pen INJECT 20 UNITS SUBCUTANEOUSLY BEFORE MEALS  1   • omeprazole (priLOSEC) 20 MG capsule Take 20 mg by mouth Before Breakfast.  0   • rOPINIRole (REQUIP) 0.5 MG tablet TAKE ONE TABLET BY MOUTH 1 TO 3 HOURS BEFORE BEDTIME  4     No current facility-administered medications for this visit.   "        OBJECTIVE    Pulse 71   Ht 165.1 cm (65\")   Wt 79.7 kg (175 lb 12.8 oz)   SpO2 96%   BMI 29.25 kg/m²       Review of Systems   Constitutional: Negative.    HENT: Negative.    Eyes: Positive for visual disturbance.   Respiratory: Positive for shortness of breath.    Cardiovascular: Negative.    Gastrointestinal: Negative.    Endocrine: Negative.    Genitourinary: Positive for enuresis.   Musculoskeletal: Positive for arthralgias, back pain and gait problem.        Foot pain     Skin: Positive for wound.   Allergic/Immunologic: Negative.    Neurological: Positive for numbness.   Hematological: Negative.    Psychiatric/Behavioral: Negative.          Diabetic Foot Exam Performed and Monofilament Test Performed       Constitutional: she appears well-developed and well-nourished.   HEENT: Normocephalic. Atraumatic  CV: No tenderness. RRR  Resp: Non-labored respiration. No wheezes.   Psychiatric: she has a normal mood and affect. her   behavior is normal.      Lower Extremity Exam:  Vascular: DP/PT pulses palpable 1+.   Negative hair growth.   Trace perimalleolar edema  Positive varicosities  Neuro: Protective sensation diminished to lesser toes, b/l.  DTRs intact  Integument: No open wounds or lesions.  Atrophic skin noted b/l   Stable, mature subungual hematoma right first and second toe.  No surrounding erythema.  Mild tenderness palpation.  No drainage.  No masses  Webspaces c/d/i  Musculoskeletal: LE muscle strength 4/5.   Gait Normal  Mild, semirigid hammertoe deformity toes 2 through 4.  Nails 1-5 b/l thickened, elongated with subungual debris. +pain on palpation  Ankle ROM decreased pain or crepitus, b/l              ASSESSMENT AND PLAN    Diagnoses and all orders for this visit:    1. Encounter for diabetic foot exam (CMS/[facility]) (Primary)    2. Diabetic polyneuropathy associated with type 2 diabetes mellitus (CMS/[facility])    3. Onychomycosis    4. Pain in toes of both feet    5. Subungual hematoma of toe of " right foot, initial encounter      -Comprehensive DM foot exam performed. Patient educated on importance of tight glucose control and daily foot checks.   -Patient educated on padding techniques for hammertoes.  Proper extra depth diabetic shoe gear.  Limit barefoot walking.  -Nails 1-5 b/l debrided in length and thickness with nail nipper to decrease pain, fungal load and risk of infection  -No underlying nailbed lacerations noted to right first and second digit.  Advised patient to continue monitoring as subungual hematoma should continue to grow out.  -We will obtain [facility] compounded pain cream for nocturnal neuropathic pains  -Follow up 3 months PRN          This document has been electronically signed by Aguila Cid DPM on October 14, 2020 11:32 CDT     EMR Dragon/Transcription disclaimer:   Much of this encounter note is an electronic transcription/translation of spoken language to printed text. The electronic translation of spoken language may permit erroneous, or at times, nonsensical words or phrases to be inadvertently transcribed; Although I have reviewed the note for such errors, some may still exist.    Aguila Cid DPM  10/14/2020  11:32 CDT

## 2021-10-06 PROCEDURE — 99283 EMERGENCY DEPT VISIT LOW MDM: CPT

## 2021-10-07 ENCOUNTER — HOSPITAL ENCOUNTER (EMERGENCY)
Facility: HOSPITAL | Age: 81
Discharge: HOME | End: 2021-10-07
Attending: EMERGENCY MEDICINE | Admitting: EMERGENCY MEDICINE

## 2021-10-07 ENCOUNTER — APPOINTMENT (OUTPATIENT)
Dept: GENERAL RADIOLOGY | Facility: HOSPITAL | Age: 81
End: 2021-10-07

## 2021-10-07 ENCOUNTER — APPOINTMENT (OUTPATIENT)
Dept: CT IMAGING | Facility: HOSPITAL | Age: 81
End: 2021-10-07

## 2021-10-07 VITALS
OXYGEN SATURATION: 94 % | BODY MASS INDEX: 29.16 KG/M2 | HEIGHT: 65 IN | HEART RATE: 62 BPM | RESPIRATION RATE: 16 BRPM | WEIGHT: 175 LBS | SYSTOLIC BLOOD PRESSURE: 178 MMHG | DIASTOLIC BLOOD PRESSURE: 87 MMHG | TEMPERATURE: 98 F

## 2021-10-07 DIAGNOSIS — S70.02XA CONTUSION OF LEFT HIP, INITIAL ENCOUNTER: Primary | ICD-10-CM

## 2021-10-07 DIAGNOSIS — S00.83XA CONTUSION OF FOREHEAD, INITIAL ENCOUNTER: ICD-10-CM

## 2021-10-07 PROCEDURE — 73700 CT LOWER EXTREMITY W/O DYE: CPT

## 2021-10-07 PROCEDURE — 73552 X-RAY EXAM OF FEMUR 2/>: CPT

## 2021-10-07 PROCEDURE — 70450 CT HEAD/BRAIN W/O DYE: CPT

## 2021-10-07 PROCEDURE — 73590 X-RAY EXAM OF LOWER LEG: CPT

## 2021-10-07 PROCEDURE — 72131 CT LUMBAR SPINE W/O DYE: CPT

## 2021-10-07 RX ORDER — HYDROCODONE BITARTRATE AND ACETAMINOPHEN 5; 325 MG/1; MG/1
1 TABLET ORAL EVERY 6 HOURS PRN
Qty: 12 TABLET | Refills: 0 | Status: SHIPPED | OUTPATIENT
Start: 2021-10-07

## 2021-10-07 RX ORDER — HYDROCODONE BITARTRATE AND ACETAMINOPHEN 5; 325 MG/1; MG/1
1 TABLET ORAL ONCE
Status: COMPLETED | OUTPATIENT
Start: 2021-10-07 | End: 2021-10-07

## 2021-10-07 RX ADMIN — HYDROCODONE BITARTRATE AND ACETAMINOPHEN 1 TABLET: 5; 325 TABLET ORAL at 01:33

## 2021-10-07 NOTE — ED NOTES
Pt states that she fell on Sunday over a box in her bedroom and fell into her dresser and hit her head. She states that she has been able to walk on her left leg all week until today and now it causes her pain to put pressure on it. She states that she also has some numbness in her left foot.      Dhara Fry, RN  10/07/21 0147

## 2021-10-07 NOTE — ED PROVIDER NOTES
Subjective   81-year-old white female presents to the emergency department chief complaint of fall. Patient relates she tripped and fell on [place], October 3. Patient hit her head. She complains of lower back pain, left hip pain, and left leg pain. She denies loss of consciousness or other injury.          Review of Systems   Constitutional: Negative for chills, diaphoresis and fever.   Respiratory: Negative for cough and shortness of breath.    Cardiovascular: Negative for chest pain and leg swelling.   Gastrointestinal: Negative for abdominal pain, nausea and vomiting.   Genitourinary: Negative for dysuria.   Musculoskeletal: Positive for arthralgias and back pain. Negative for neck pain.   Neurological: Positive for numbness and headaches. Negative for syncope and weakness.   All other systems reviewed and are negative.      Past Medical History:   Diagnosis Date   • CHF (congestive heart failure) (HCC)    • Diabetes (HCC)    • Hypertension    • Skin cancer        Allergies   Allergen Reactions   • Codeine Diarrhea and Nausea And Vomiting   • Lisinopril Cough       Past Surgical History:   Procedure Laterality Date   • APPENDECTOMY     • CHOLECYSTECTOMY OPEN     • CORONARY ANGIOPLASTY     • CORONARY ARTERY BYPASS GRAFT     • KIDNEY STONE SURGERY     • PACEMAKER IMPLANTATION     • SHOULDER SURGERY Right        Family History   Problem Relation Age of Onset   • Heart disease Father    • Hypertension Father    • Diabetes Daughter    • Clotting disorder Mother    • Diabetes Maternal Grandmother    • Clotting disorder Other        Social History     Socioeconomic History   • Marital status:      Spouse name: Not on file   • Number of children: Not on file   • Years of education: Not on file   • Highest education level: Not on file   Tobacco Use   • Smoking status: Former Smoker     Quit date: 1979     Years since quittin.1   • Smokeless tobacco: Never Used   Substance and Sexual Activity   • Alcohol  use: No           Objective   Physical Exam  Vitals and nursing note reviewed.   Constitutional:       General: She is not in acute distress.     Appearance: She is not ill-appearing, toxic-appearing or diaphoretic.   HENT:      Head:      Comments: Contusion right forehead.     Right Ear: External ear normal.      Left Ear: External ear normal.      Nose: Nose normal.      Mouth/Throat:      Mouth: Mucous membranes are moist.      Pharynx: Oropharynx is clear.   Eyes:      Extraocular Movements: Extraocular movements intact.      Conjunctiva/sclera: Conjunctivae normal.      Pupils: Pupils are equal, round, and reactive to light.   Cardiovascular:      Rate and Rhythm: Normal rate and regular rhythm.      Pulses: Normal pulses.      Heart sounds: Normal heart sounds.   Pulmonary:      Effort: Pulmonary effort is normal.      Breath sounds: Normal breath sounds.   Abdominal:      General: Bowel sounds are normal. There is no distension.      Palpations: Abdomen is soft. There is no mass.      Tenderness: There is no abdominal tenderness. There is no guarding.   Musculoskeletal:      Cervical back: Normal range of motion and neck supple.      Comments: No tenderness of the cervical or thoracic spine. There is tenderness of the lumbar spine, left hip, left thigh, and left lower leg. No deformity. Neurovascular intact distally.   Skin:     General: Skin is warm and dry.   Neurological:      Mental Status: She is alert and oriented to person, place, and time.      Cranial Nerves: No cranial nerve deficit.      Sensory: No sensory deficit.      Motor: No weakness.      Comments: GCS 15.   Psychiatric:         Mood and Affect: Mood normal.         Behavior: Behavior normal.         Procedures           ED Course  ED Course as of Oct 07 0309   u Oct 07, 2021   0247 Reviewed eKasper report # [id number]    [DR]   0306 Patient is alert and resting comfortably. I reviewed the results of the emergency department evaluation  with the patient.  I recommended primary care follow-up.  I advised the patient to return to the emergency department if their symptoms change or worsen.     [DR]      ED Course User Index  [DR] Hardik Mcallister MD          Labs Reviewed - No data to display  XR Femur 2 View Left    Result Date: 10/7/2021  Narrative: CLINICAL HISTORY:fall, left leg pain XR FEMUR 2 VIEWS left Comparison: None Findings: There is no acute fracture or dislocation. Medial greater than lateral joint space narrowing of the knee with chondrocalcinosis. Atherosclerotic vascular disease. Surgical staples are seen at the medial aspect of the distal thigh     Impression: Impression: No acute osseous abnormality. Electronically signed by:  Brigette Riley DO  10/7/2021 3:02 AM CDT Workstation: [id number]    XR Tibia Fibula 2 View Left    Result Date: 10/7/2021  Narrative: EXAM DESCRIPTION: XR TIBIA FIBULA 2 VW LEFT 10/7/2021 1:53 AM CDT RadLex: XR TIBIA FIBULA 2 VIEWS CLINICAL HISTORY: 81 years Female, fall, left leg pain COMPARISON: None. FINDINGS: 4 images of the left tibia and fibula. Surgical clips are present medial to the distal femur likely related to harvesting of the greater saphenous vein. Atherosclerosis. Small knee effusion. Enthesophytes on the anterior superior anterior inferior aspect of the patella has enthesophytes within the quadriceps tendon and the patellar ligament. Calcaneal spurring. Some knee and some ankle and foot arthritis Sclerosis within the distal tibial diaphysis may represent an enchondroma. Kristin alignment is normal No bones are fractured. No foreign bodies are perceived No soft tissue swelling is present.     Impression: 1.  No acute bony abnormality. 2.  Atherosclerosis. Electronically signed by:  Domingo Jacinto MD  10/7/2021 2:55 AM CDT Workstation: [id number]    CT Head Without Contrast    Result Date: 10/7/2021  Narrative: EXAM:CT HEAD WO CONTRAST 10/7/2021 1:45 AM CDT CLINICAL INDICATION: fall, head injury  COMPARISON: December 11, 2019 TECHNIQUE: Axial CT images of the head are obtained from the skull base to the vertex without IV contrast. Axial, sagittal, and coronal images are interpreted. This exam was performed according to our departmental dose-optimization protocol, which includes automated exposure control, adjustment of the mA and/or kV according to patient size and/or use of iterative reconstruction technique.       FINDINGS: CORTEX: Brain is atrophic. Gray-white differentiation is preserved. No acute intracranial hemorrhage or edema is perceived. No masses or herniation WHITE MATTER: Some periventricular deep and subcortical white matter disease just above chronic microvascular ischemia is similar To the previous study BASAL GANGLIA: Basal ganglia are intact. 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  No lytic or sclerotic lesions. SCALP: High right parietal scalp contusion has resolved ORBITS, VISUALIZED PARANASAL SINUSES AND MASTOIDS: Visualized paranasal sinuses are clear. The mastoid air cells are clear. No orbital pathology.     Impression: 1.  No acute intracranial finding. 2.  High right parietal scalp contusion has resolved. 3.  Stable encephalomalacia within the left cerebellar hemisphere due to sequelae of previous insult, likely infarct. 4.  Brain is atrophic. Some periventricular deep and subcortical white matter disease just above chronic microvascular ischemia is similar to the previous study. Electronically signed by:  Domingo Jacinto MD  10/7/2021 2:14 AM CDT Workstation: [id number]    CT Lumbar Spine Without Contrast    Result Date: 10/7/2021  Narrative: CT LUMBAR SPINE Clinical history:fall, low back pain TECHNIQUE:  Helically acquired images were obtained of the lumbar spine. 2D  reformats were reviewed. A radiation dose optimization technique was used for this scan. IV Contrast dosage and agent: [None.] COMPARISON: Radiograph of the lumbar spine on October 5, 2021 and June 16, 2020 FINDINGS:  VERTEBRAE: Inferior endplate deformity with anterior compression is seen at the L1 vertebral body. This was present on the prior radiograph from March 16, 2020 and appears chronic. No acute fracture is noted. VERTEBRAL ALIGNMENT: No spondylolisthesis. Lumbar lordosis is preserved. There is minimal right convexity scoliosis. DISCS: Degenerative discogenic changes are noted. Decreased disc space at L3-4 and L5-S1. There is a diffuse annular bulge L5-S1 without evidence of canal stenosis. There is also diffuse annular bulge at L3-4 with the canal at lower limits of normal. Facet arthrosis is seen greatest from the level of L3-4 through L5-S1. Sacroiliac joints are not widened. There is vacuum phenomena with mild degenerative change noted. INCLUDED RETROPERITONEUM: No descending abdominal aortic aneurysm No retroperitoneal adenopathy     Impression: 1.  No acute fracture or subluxation. 2.  Chronic inferior endplate deformity with anterior compression at L1. 3.  Degenerative discogenic and facet disease. Electronically signed by:  Brigette Riley DO  10/7/2021 2:34 AM CDT Workstation: [id number]    CT Lower Extremity Left Without Contrast    Result Date: 10/7/2021  Narrative: CLINICAL HISTORY:fall, left hip pain TECHNIQUE: Routine bone CT protocol was performed of the left hip. 2-D reformats were performed by the technologist. A radiation dose optimization technique was used for this scan. IV Contrast dosage and agent: None COMPARISON: Radiograph of left femur on October 7, 2021 FINDINGS: BONES: There is no acute fracture or dislocation. The left sacroiliac joint is not widened. There are mild degenerative changes. Vacuum phenomena seen at the joint space. There is superolateral joint space narrowing  of the left hip. Subchondral cyst formation is seen at the anterior and posterior acetabulum.  SOFT TISSUES: Atherosclerotic vascular disease.  [No radiopaque foreign body.]     Impression: 1.  No acute fracture or dislocation. 2.  Mild left hip osteoarthritis. Electronically signed by:  Brigette Riley DO  10/7/2021 2:37 AM CDT Workstation: [id number]                                    [facility]    Final diagnoses:   Contusion of forehead, initial encounter   Contusion of left hip, initial encounter       ED Disposition  ED Disposition     ED Disposition Condition Comment    Discharge Stable           Bk Nicole, APRN  [address]  [place] [address] [phone]    Schedule an appointment as soon as possible for a visit in 5 days           Medication List      New Prescriptions    HYDROcodone-acetaminophen 5-325 MG per tablet  Commonly known as: NORCO  Take 1 tablet by mouth Every 6 (Six) Hours As Needed for Severe Pain .           Where to Get Your Medications      These medications were sent to [facility] - [place] [facility] - [address]  [phone] [facility] [phone]   [address] [place] [address]    Phone: [phone]   · HYDROcodone-acetaminophen 5-325 MG per tablet          Hardik Mcallister MD  10/07/21 8281

## 2021-10-08 ENCOUNTER — HOSPITAL ENCOUNTER (OUTPATIENT)
Dept: CT IMAGING | Facility: HOSPITAL | Age: 81
Discharge: HOME | End: 2021-10-08
Admitting: NURSE PRACTITIONER

## 2021-10-08 DIAGNOSIS — N20.0 STONE, KIDNEY: ICD-10-CM

## 2021-10-08 PROCEDURE — 74176 CT ABD & PELVIS W/O CONTRAST: CPT

## 2022-07-21 ENCOUNTER — OFFICE VISIT (OUTPATIENT)
Dept: OTOLARYNGOLOGY | Facility: CLINIC | Age: 82
End: 2022-07-21

## 2022-07-21 VITALS — BODY MASS INDEX: 28.09 KG/M2 | WEIGHT: 168.6 LBS | TEMPERATURE: 97.4 F | HEIGHT: 65 IN | OXYGEN SATURATION: 97 %

## 2022-07-21 DIAGNOSIS — H93.11 TINNITUS OF RIGHT EAR: ICD-10-CM

## 2022-07-21 DIAGNOSIS — H81.12 BPPV (BENIGN PAROXYSMAL POSITIONAL VERTIGO), LEFT: Primary | ICD-10-CM

## 2022-07-21 PROCEDURE — 99213 OFFICE O/P EST LOW 20 MIN: CPT | Performed by: OTOLARYNGOLOGY

## 2022-07-26 NOTE — PROGRESS NOTES
"Subjective   Nicole Webb is a 81 y.o. female.       History of Present Illness   Patient reports she has had spinning dizziness when she lies down.  She has had multiple falls.  Says she hears \"music\" and cracking and popping in her right ear.  No otorrhea.      The following portions of the patient's history were reviewed and updated as appropriate: allergies, current medications, past family history, past medical history, past social history, past surgical history and problem list.     reports that she quit smoking about 42 years ago. She has never used smokeless tobacco. She reports that she does not drink alcohol.   Patient is not a tobacco user and has not been counseled for use of tobacco products      Review of Systems        Objective   Physical Exam  Ears: External ears no deformity, canals no discharge, tympanic membranes intact clear and mobile bilaterally.  Nares: Boggy mucosa no discharge or purulence  Oral cavity: No masses or lesions  Pharynx: No erythema or exudate  Neck: No adenopathy or mass  Woody-Hallpike maneuvers produce a classic latent rotatory nystagmus and subjective vertigo with the head to the left         Assessment and Plan   Diagnoses and all orders for this visit:    1. BPPV (benign paroxysmal positional vertigo), left (Primary)    2. Tinnitus of right ear               Plan: Explained the nature of BPPV to the patient.  Explained Cawthorne exercises and advised her to perform these morning and evening as well as utilizing a walking stick, adequate lighting, and other balance precautions to prevent falls.  Reassured her that she has no evidence of infection or fluid in her ears.  Return in 1 month.  Will obtain an audiogram at that time.  "

## 2022-08-24 ENCOUNTER — CLINICAL SUPPORT (OUTPATIENT)
Dept: AUDIOLOGY | Facility: CLINIC | Age: 82
End: 2022-08-24

## 2022-08-24 ENCOUNTER — OFFICE VISIT (OUTPATIENT)
Dept: OTOLARYNGOLOGY | Facility: CLINIC | Age: 82
End: 2022-08-24

## 2022-08-24 VITALS
WEIGHT: 167 LBS | HEIGHT: 65 IN | DIASTOLIC BLOOD PRESSURE: 77 MMHG | SYSTOLIC BLOOD PRESSURE: 171 MMHG | BODY MASS INDEX: 27.82 KG/M2

## 2022-08-24 DIAGNOSIS — H90.3 ASYMMETRICAL SENSORINEURAL HEARING LOSS: ICD-10-CM

## 2022-08-24 DIAGNOSIS — R42 DIZZINESS: ICD-10-CM

## 2022-08-24 DIAGNOSIS — H93.11 TINNITUS OF RIGHT EAR: ICD-10-CM

## 2022-08-24 DIAGNOSIS — H90.3 SENSORINEURAL HEARING LOSS, ASYMMETRICAL: Primary | ICD-10-CM

## 2022-08-24 DIAGNOSIS — H81.12 BPPV (BENIGN PAROXYSMAL POSITIONAL VERTIGO), LEFT: Primary | ICD-10-CM

## 2022-08-24 PROCEDURE — 92567 TYMPANOMETRY: CPT | Performed by: AUDIOLOGIST

## 2022-08-24 PROCEDURE — 92557 COMPREHENSIVE HEARING TEST: CPT | Performed by: AUDIOLOGIST

## 2022-08-24 PROCEDURE — 99213 OFFICE O/P EST LOW 20 MIN: CPT | Performed by: OTOLARYNGOLOGY

## 2022-08-25 NOTE — PROGRESS NOTES
STANDARD AUDIOMETRIC EVALUATION      Name:  Nicole Webb  :  1940  Age:  81 y.o.  Date of Evaluation:  2022      HISTORY    Reason for visit:  Nicole eWbb is seen today for a hearing test at the request of Dr. Jhonathan Altamirano.  Patient reports she has been doing exercised for her dizziness, but she still gets dizzy.  She states she has hearing loss, worse in her right ear, and she has to turn the TV up loud.       EVALUATION    See Audiogram    RESULTS        Otoscopy and Tympanometry 226 Hz :  Right Ear:  Otoscopy:  Clear ear canal          Tympanometry:  Middle ear function within normal limits    Left Ear:   Otoscopy:  Clear ear canal        Tympanometry:  Middle ear function within normal limits    Test technique:  Standard Audiometry     Pure Tone Audiometry:   Patient responded to pure tones at 40-75 dB for 250-8000 Hz in right ear, and at 30-65 dB for 250-8000 Hz in left ear.       Speech Audiometry:        Right Ear:  Speech Reception Threshold (SRT) was obtained at 45 dBHL                 Speech Discrimination scores were 68% in quiet when words were presented at 85 dBHL       Left Ear:  Speech Reception Threshold (SRT) was obtained at 30 dBHL                 Speech Discrimination scores were 96% in quiet when words were presented at 70 dBHL    Reliability:   good    IMPRESSIONS:  1.  Tympanometry results are consistent with Middle ear function within normal limits in both ears.  2.  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       RECOMMENDATIONS:  Patient is seeing the Ear Nose and Throat physician immediately following this examination.  It was a pleasure seeing Nicole Webb in Audiology today.  We would be happy to do further testing or discuss these test as necessary.          This document has been  electronically signed by Jenna Mcmillan MS CCC-A on August 25, 2022 08:19 CDT       Jenna Mcmillan MS CCC-JORGE  Licensed Audiologist

## 2022-08-28 NOTE — PROGRESS NOTES
Subjective   Nicole Webb is a 81 y.o. female.       History of Present Illness   Patient had been seen previously with BPPV of the left ear and tinnitus of the right ear.  Has been performing her exercises at home and he is improving although has not completely resolved.  Still has tinnitus on the right side.  Feels like her hearing is chronically decreased more so on the right than the left.    The following portions of the patient's history were reviewed and updated as appropriate: allergies, current medications, past family history, past medical history, past social history, past surgical history and problem list.     reports that she quit smoking about 43 years ago. She has never used smokeless tobacco. She reports that she does not drink alcohol.   Patient is not a tobacco user and has not been counseled for use of tobacco products      Review of Systems        Objective   Physical Exam  Ears: External ears no deformity, canals no discharge, tympanic membranes intact clear and mobile bilaterally.  Stewart-Hallpike maneuver still produce latent rotatory nystagmus and subjective vertigo with the head to the right but it is not as brisk as it was previously.    Audiogram is obtained and reviewed and shows asymmetrical hearing loss with the right ear worse than the left in all frequencies.  This is more pronounced in the low pitches.  Tympanograms are type a bilaterally.  Discrimination is 96% on the left 68% on the right         Assessment and Plan   Diagnoses and all orders for this visit:    1. BPPV (benign paroxysmal positional vertigo), left (Primary)    2. Tinnitus of right ear    3. Asymmetrical sensorineural hearing loss           Plan: Explained the nature of tinnitus to the patient and its relationship to hearing loss.  Advise use of masking noise as needed.  Could consider amplification if she so desires.  Encouraged her to continue performing her Cawthorne exercises and I suspect her vertigo will  eventually resolve.  She is to return in 6 months with an audiogram but call if she worsens

## 2023-03-01 ENCOUNTER — CLINICAL SUPPORT (OUTPATIENT)
Dept: AUDIOLOGY | Facility: CLINIC | Age: 83
End: 2023-03-01
Payer: MEDICARE

## 2023-03-01 ENCOUNTER — OFFICE VISIT (OUTPATIENT)
Dept: OTOLARYNGOLOGY | Facility: CLINIC | Age: 83
End: 2023-03-01
Payer: MEDICARE

## 2023-03-01 VITALS — WEIGHT: 158.4 LBS | OXYGEN SATURATION: 92 % | HEIGHT: 65 IN | BODY MASS INDEX: 26.39 KG/M2

## 2023-03-01 DIAGNOSIS — H90.3 ASYMMETRICAL SENSORINEURAL HEARING LOSS: ICD-10-CM

## 2023-03-01 DIAGNOSIS — H90.3 SENSORINEURAL HEARING LOSS (SNHL) OF BOTH EARS: Primary | ICD-10-CM

## 2023-03-01 DIAGNOSIS — R42 DIZZINESS: ICD-10-CM

## 2023-03-01 DIAGNOSIS — H81.12 BPPV (BENIGN PAROXYSMAL POSITIONAL VERTIGO), LEFT: Primary | ICD-10-CM

## 2023-03-01 DIAGNOSIS — H93.11 TINNITUS OF RIGHT EAR: ICD-10-CM

## 2023-03-01 PROCEDURE — 92557 COMPREHENSIVE HEARING TEST: CPT | Performed by: AUDIOLOGIST

## 2023-03-01 PROCEDURE — 99213 OFFICE O/P EST LOW 20 MIN: CPT | Performed by: OTOLARYNGOLOGY

## 2023-03-01 PROCEDURE — 92567 TYMPANOMETRY: CPT | Performed by: AUDIOLOGIST

## 2023-03-02 NOTE — PROGRESS NOTES
STANDARD AUDIOMETRIC EVALUATION      Name:  Nicole Webb  :  1940  Age:  82 y.o.  Date of Evaluation:  3/2/2023      HISTORY    Reason for visit:  Nicole Webb is seen today for a hearing test at the request of Dr. Jhonathan Altamirano.  Patient reports her right ear feels stopped up, and she hears a squishy noise in her right ear.  She states she may have a change in her hearing.  She states people have to repeat things to her, and she has to turn the TV up loud.  She states she gets dizzy when she gets up.       EVALUATION    See Audiogram    RESULTS        Otoscopy and Tympanometry 226 Hz :  Right Ear:  Otoscopy:  Clear ear canal          Tympanometry:  Middle ear function within normal limits    Left Ear:   Otoscopy:  Clear ear canal        Tympanometry:  Middle ear function within normal limits    Test technique:  Standard Audiometry     Pure Tone Audiometry:   Patient responded to pure tones at 40-70 dB for 250-8000 Hz in right ear, and at 25-60 dB for 250-8000 Hz in left ear.       Speech Audiometry:        Right Ear:  Speech Reception Threshold (SRT) was obtained at 35 dBHL                 Speech Discrimination scores were 84% in quiet when words were presented at 75 dBHL       Left Ear:  Speech Reception Threshold (SRT) was obtained at 35 dBHL                 Speech Discrimination scores were 100% in quiet when words were presented at 70 dBHL    Reliability:   good    IMPRESSIONS:  1.  Tympanometry results are consistent with Middle ear function within normal limits in both ears.  2.  Pure tone results are consistent with moderate to moderately severe reverse cookie bite sensorineural hearing loss  for right ear, and mild to moderate reverse cookie bite sensorineural hearing loss  in left ear.       RECOMMENDATIONS:  Patient is seeing the Ear Nose and Throat physician immediately following this examination.  It was a pleasure seeing Nicole Webb in Audiology today.  We would be  happy to do further testing or discuss these test as necessary.          This document has been electronically signed by Jenna Mcmillan MS CCC-JORGE on March 2, 2023 15:22 CST       Jenna Mcmillan MS CCC-A  Licensed Audiologist

## 2023-03-03 NOTE — PROGRESS NOTES
Subjective   Nicole Webb is a 82 y.o. female.       History of Present Illness   Patient has been followed with BPPV of the left ear, tinnitus of the right ear, and asymmetrical sensorineural hearing loss.  BPPV improved initially but has returned lately.  She says sometimes she cannot do the Cawthorne exercises due to back problems.  Feels like her hearing is still worse on the right than the left with ringing in her right ear but does not feel like she is any worse      The following portions of the patient's history were reviewed and updated as appropriate: allergies, current medications, past family history, past medical history, past social history, past surgical history and problem list.     reports that she quit smoking about 43 years ago. Her smoking use included cigarettes. She has never used smokeless tobacco. She reports that she does not drink alcohol.   Patient is not a tobacco user and has not been counseled for use of tobacco products      Review of Systems        Objective   Physical Exam  Ears: External ears no deformity, canals no discharge, tympanic membranes intact clear and mobile bilaterally.  Stewart-Hallpike maneuvers do produce a latent rotatory nystagmus and subjective spinning vertigo with head to the left    Audiogram is obtained and reviewed and shows slightly asymmetrical sensorineural hearing loss worse on the right than the left.  Tympanograms are type a bilaterally.  Discrimination scores are 84% on the right 100% on the left.  Compared to her previous hearing test from August 2022 there is not been any significant change in her discrimination is actually a little bit improved.         Assessment and Plan   Diagnoses and all orders for this visit:    1. BPPV (benign paroxysmal positional vertigo), left (Primary)    2. Tinnitus of right ear    3. Asymmetrical sensorineural hearing loss             Plan: Reassured the patient that her hearing was stable.  Told her she could  consider hearing aids if she so desires.  Advised her to do her Cawthorne exercises if she was able and otherwise to just take care when turning her head lying down at night or reaching up to get something off a shelf.  Return in a year with another hearing test and call sooner for problems.

## 2023-04-11 ENCOUNTER — OFFICE VISIT (OUTPATIENT)
Dept: PODIATRY | Facility: CLINIC | Age: 83
End: 2023-04-11
Payer: MEDICARE

## 2023-04-11 VITALS
SYSTOLIC BLOOD PRESSURE: 170 MMHG | DIASTOLIC BLOOD PRESSURE: 82 MMHG | OXYGEN SATURATION: 99 % | HEIGHT: 65 IN | BODY MASS INDEX: 26.33 KG/M2 | WEIGHT: 158 LBS

## 2023-04-11 DIAGNOSIS — M79.671 BILATERAL FOOT PAIN: Primary | ICD-10-CM

## 2023-04-11 DIAGNOSIS — L60.2 ONYCHOGRYPHOSIS: ICD-10-CM

## 2023-04-11 DIAGNOSIS — M77.41 METATARSALGIA OF BOTH FEET: ICD-10-CM

## 2023-04-11 DIAGNOSIS — M79.672 BILATERAL FOOT PAIN: Primary | ICD-10-CM

## 2023-04-11 DIAGNOSIS — B35.1 ONYCHOMYCOSIS: ICD-10-CM

## 2023-04-11 DIAGNOSIS — M77.42 METATARSALGIA OF BOTH FEET: ICD-10-CM

## 2023-04-11 DIAGNOSIS — E11.42 TYPE 2 DIABETES MELLITUS WITH PERIPHERAL NEUROPATHY: ICD-10-CM

## 2023-04-11 RX ORDER — GABAPENTIN 300 MG/1
300 CAPSULE ORAL NIGHTLY
Qty: 30 CAPSULE | Refills: 0 | Status: SHIPPED | OUTPATIENT
Start: 2023-04-11

## 2023-04-11 NOTE — PROGRESS NOTES
Nicole Webb  1940  82 y.o. female  BS:150  PCP:Bonnie:4/10/23    2023    Chief Complaint   Patient presents with   • Left Foot - Pain       History of Present Illness    Nicole Webb is a 82 y.o.female Patient presents to clinic for left and right foot pain.Pain is in her big toe area and the ball of her foot.       Past Medical History:   Diagnosis Date   • CHF (congestive heart failure)    • Diabetes    • Hypertension    • Skin cancer          Past Surgical History:   Procedure Laterality Date   • APPENDECTOMY     • CHOLECYSTECTOMY OPEN     • CORONARY ANGIOPLASTY     • CORONARY ARTERY BYPASS GRAFT     • KIDNEY STONE SURGERY     • PACEMAKER IMPLANTATION     • SHOULDER SURGERY Right          Family History   Problem Relation Age of Onset   • Heart disease Father    • Hypertension Father    • Diabetes Daughter    • Clotting disorder Mother    • Diabetes Maternal Grandmother    • Clotting disorder Other        Allergies   Allergen Reactions   • Codeine Diarrhea and Nausea And Vomiting   • Lisinopril Cough       Social History     Socioeconomic History   • Marital status:    Tobacco Use   • Smoking status: Former     Types: Cigarettes     Quit date: 1979     Years since quittin.6   • Smokeless tobacco: Never   Substance and Sexual Activity   • Alcohol use: No         Current Outpatient Medications   Medication Sig Dispense Refill   • aspirin 81 MG chewable tablet Chew 1 tablet Daily.     • atorvastatin (LIPITOR) 40 MG tablet TAKE ONE TABLET BY MOUTH ONCE DAILY  1   • BRILINTA 90 MG tablet tablet Take 1 tablet by mouth 2 (Two) Times a Day.  1   • citalopram (CeleXA) 40 MG tablet TAKE ONE TABLET BY MOUTH ONCE DAILY  2   • FLUZONE HIGH-DOSE 0.5 ML suspension prefilled syringe injection TO BE ADMINISTERED BY PHARMACIST FOR IMMUNIZATION  0   • HYDROcodone-acetaminophen (NORCO) 5-325 MG per tablet Take 1 tablet by mouth Every 6 (Six) Hours As Needed for Severe Pain . 12 tablet 0  "  • isosorbide mononitrate (IMDUR) 60 MG 24 hr tablet Take 1 tablet by mouth Every Morning.  11   • LEVEMIR FLEXTOUCH 100 UNIT/ML injection Inject 70 Units under the skin into the appropriate area as directed every night at bedtime.  1   • losartan (COZAAR) 50 MG tablet TAKE ONE TABLET BY MOUTH ONCE DAILY  0   • metFORMIN (GLUCOPHAGE) 500 MG tablet      • methIMAzole (TAPAZOLE) 5 MG tablet      • metoprolol succinate XL (TOPROL-XL) 50 MG 24 hr tablet      • mupirocin (BACTROBAN) 2 % ointment Apply to forehead lesion and cover with bandage Apply to neck lesion twice a day 15 g 1   • nitroglycerin (NITROSTAT) 0.4 MG SL tablet Place 1 tablet under the tongue.     • NOVOLOG FLEXPEN 100 UNIT/ML solution pen-injector sc pen INJECT 20 UNITS SUBCUTANEOUSLY BEFORE MEALS  1   • omeprazole (priLOSEC) 20 MG capsule Take 1 capsule by mouth Before Breakfast.  0   • rOPINIRole (REQUIP) 0.5 MG tablet TAKE ONE TABLET BY MOUTH 1 TO 3 HOURS BEFORE BEDTIME  4   • gabapentin (Neurontin) 300 MG capsule Take 1 capsule by mouth Every Night. 30 capsule 0     No current facility-administered medications for this visit.       Review of Systems   Constitutional: Negative.    Respiratory: Negative.    Cardiovascular: Negative.    Gastrointestinal: Negative.    Musculoskeletal:        Foot pain    Skin: Negative.    Psychiatric/Behavioral: Negative.          OBJECTIVE    /82   Ht 165.1 cm (65\")   Wt 71.7 kg (158 lb)   SpO2 99%   BMI 26.29 kg/m²     Body mass index is 26.29 kg/m².        Physical Exam  Vitals reviewed.   Constitutional:       General: She is not in acute distress.     Appearance: She is well-developed.   HENT:      Head: Normocephalic and atraumatic.      Nose: Nose normal.   Eyes:      Conjunctiva/sclera: Conjunctivae normal.      Pupils: Pupils are equal, round, and reactive to light.   Cardiovascular:      Pulses:           Dorsalis pedis pulses are 1+ on the right side.        Posterior tibial pulses are 1+ on the " right side.   Pulmonary:      Effort: Pulmonary effort is normal. No respiratory distress.      Breath sounds: No wheezing.   Musculoskeletal:      Right foot: Prominent metatarsal heads present.   Feet:      Right foot:      Skin integrity: Skin integrity normal.      Toenail Condition: Right toenails are abnormally thick. Fungal disease present.  Skin:     General: Skin is warm and dry.      Capillary Refill: Capillary refill takes less than 2 seconds.   Neurological:      Mental Status: She is alert and oriented to person, place, and time.   Psychiatric:         Behavior: Behavior normal.         Thought Content: Thought content normal.                Procedures        ASSESSMENT AND PLAN    Diagnoses and all orders for this visit:    1. Bilateral foot pain (Primary)  -     XR foot 3+ vw bilateral; Future  -     gabapentin (Neurontin) 300 MG capsule; Take 1 capsule by mouth Every Night.  Dispense: 30 capsule; Refill: 0    2. Type 2 diabetes mellitus with peripheral neuropathy    3. Onychogryphosis    4. Onychomycosis    5. Metatarsalgia of both feet      Body mass index is 26.29 kg/m².      - Patient examined and evaluated  -Radiographs taken and reviewed.  No acute pathology.  -Etiology and treatment of neuropathic foot pain discussed in detail with patient.  Start gabapentin 300 mg in the evening.  -Recommended OTC arch supports with built-in metatarsal pads  - All questions were answered   - RTC 3 months          This document has been electronically signed by Joel Mendoza DPM on April 18, 2023 13:14 CDT

## 2023-05-05 DIAGNOSIS — M79.672 BILATERAL FOOT PAIN: ICD-10-CM

## 2023-05-05 DIAGNOSIS — M79.671 BILATERAL FOOT PAIN: ICD-10-CM

## 2023-05-08 RX ORDER — GABAPENTIN 300 MG/1
CAPSULE ORAL
Qty: 30 CAPSULE | Refills: 0 | Status: SHIPPED | OUTPATIENT
Start: 2023-05-08

## 2023-05-31 ENCOUNTER — OFFICE VISIT (OUTPATIENT)
Dept: PODIATRY | Facility: CLINIC | Age: 83
End: 2023-05-31
Payer: MEDICARE

## 2023-05-31 VITALS — HEIGHT: 65 IN | BODY MASS INDEX: 26.33 KG/M2 | WEIGHT: 158 LBS

## 2023-05-31 DIAGNOSIS — M20.5X2 HALLUX LIMITUS, LEFT: ICD-10-CM

## 2023-05-31 DIAGNOSIS — M79.672 LEFT FOOT PAIN: Primary | ICD-10-CM

## 2023-05-31 NOTE — PROGRESS NOTES
Nicole Webb  1940  82 y.o. female  BS:159 per patient   PCP:Bonnie:4/10/23    2023    Chief Complaint   Patient presents with   • Left Foot - Pain   • Right Foot - Pain       History of Present Illness    Nicole Webb is a 82 y.o.female who presents to clinic with chief complaint of left foot pain.  Pain is primary located to the big toe joint.      Past Medical History:   Diagnosis Date   • CHF (congestive heart failure)    • Diabetes    • Hypertension    • Skin cancer          Past Surgical History:   Procedure Laterality Date   • APPENDECTOMY     • CHOLECYSTECTOMY OPEN     • CORONARY ANGIOPLASTY     • CORONARY ARTERY BYPASS GRAFT     • KIDNEY STONE SURGERY     • PACEMAKER IMPLANTATION     • SHOULDER SURGERY Right          Family History   Problem Relation Age of Onset   • Heart disease Father    • Hypertension Father    • Diabetes Daughter    • Clotting disorder Mother    • Diabetes Maternal Grandmother    • Clotting disorder Other        Allergies   Allergen Reactions   • Codeine Diarrhea and Nausea And Vomiting   • Lisinopril Cough       Social History     Socioeconomic History   • Marital status:    Tobacco Use   • Smoking status: Former     Types: Cigarettes     Quit date: 1979     Years since quittin.8   • Smokeless tobacco: Never   Substance and Sexual Activity   • Alcohol use: No         Current Outpatient Medications   Medication Sig Dispense Refill   • aspirin 81 MG chewable tablet Chew 1 tablet Daily.     • atorvastatin (LIPITOR) 40 MG tablet TAKE ONE TABLET BY MOUTH ONCE DAILY  1   • BRILINTA 90 MG tablet tablet Take 1 tablet by mouth 2 (Two) Times a Day.  1   • citalopram (CeleXA) 40 MG tablet TAKE ONE TABLET BY MOUTH ONCE DAILY  2   • FLUZONE HIGH-DOSE 0.5 ML suspension prefilled syringe injection TO BE ADMINISTERED BY PHARMACIST FOR IMMUNIZATION  0   • gabapentin (NEURONTIN) 300 MG capsule TAKE 1 CAPSULE EVERY NIGHT 30 capsule 0   •  "HYDROcodone-acetaminophen (NORCO) 5-325 MG per tablet Take 1 tablet by mouth Every 6 (Six) Hours As Needed for Severe Pain . 12 tablet 0   • isosorbide mononitrate (IMDUR) 60 MG 24 hr tablet Take 1 tablet by mouth Every Morning.  11   • LEVEMIR FLEXTOUCH 100 UNIT/ML injection Inject 70 Units under the skin into the appropriate area as directed every night at bedtime.  1   • losartan (COZAAR) 50 MG tablet TAKE ONE TABLET BY MOUTH ONCE DAILY  0   • metFORMIN (GLUCOPHAGE) 500 MG tablet      • methIMAzole (TAPAZOLE) 5 MG tablet      • metoprolol succinate XL (TOPROL-XL) 50 MG 24 hr tablet      • mupirocin (BACTROBAN) 2 % ointment Apply to forehead lesion and cover with bandage Apply to neck lesion twice a day 15 g 1   • nitroglycerin (NITROSTAT) 0.4 MG SL tablet Place 1 tablet under the tongue.     • NOVOLOG FLEXPEN 100 UNIT/ML solution pen-injector sc pen INJECT 20 UNITS SUBCUTANEOUSLY BEFORE MEALS  1   • omeprazole (priLOSEC) 20 MG capsule Take 1 capsule by mouth Before Breakfast.  0   • rOPINIRole (REQUIP) 0.5 MG tablet TAKE ONE TABLET BY MOUTH 1 TO 3 HOURS BEFORE BEDTIME  4     No current facility-administered medications for this visit.       Review of Systems   Constitutional: Negative.    Respiratory: Negative.    Cardiovascular: Negative.    Gastrointestinal: Negative.    Musculoskeletal:        Left foot pain    Skin: Negative.    Psychiatric/Behavioral: Negative.          OBJECTIVE    Ht 165.1 cm (65\")   Wt 71.7 kg (158 lb)   BMI 26.29 kg/m²     Body mass index is 26.29 kg/m².        Physical Exam  Vitals reviewed.   Constitutional:       General: She is not in acute distress.     Appearance: She is well-developed.   HENT:      Head: Normocephalic and atraumatic.      Nose: Nose normal.   Eyes:      Conjunctiva/sclera: Conjunctivae normal.      Pupils: Pupils are equal, round, and reactive to light.   Cardiovascular:      Pulses:           Dorsalis pedis pulses are 1+ on the right side and 2+ on the left " side.        Posterior tibial pulses are 1+ on the right side and 2+ on the left side.   Pulmonary:      Effort: Pulmonary effort is normal. No respiratory distress.      Breath sounds: No wheezing.   Musculoskeletal:      Right foot: Decreased range of motion. Prominent metatarsal heads present.      Left foot: Decreased range of motion. Deformity and prominent metatarsal heads present.        Feet:    Feet:      Right foot:      Skin integrity: Skin integrity normal.      Toenail Condition: Right toenails are abnormally thick. Fungal disease present.  Skin:     General: Skin is warm and dry.      Capillary Refill: Capillary refill takes less than 2 seconds.   Neurological:      Mental Status: She is alert and oriented to person, place, and time.   Psychiatric:         Behavior: Behavior normal.         Thought Content: Thought content normal.                Small Joint Arthrocentesis: L great MTP  Consent given by: patient  Site marked: site marked  Timeout: Immediately prior to procedure a time out was called to verify the correct patient, procedure, equipment, support staff and site/side marked as required   Supporting Documentation  Indications: pain   Procedure Details  Location: great toe - L great MTP  Needle size: 27 G  Approach: dorsal  Patient tolerance: patient tolerated the procedure well with no immediate complications              ASSESSMENT AND PLAN    Diagnoses and all orders for this visit:    1. Left foot pain (Primary)    2. Hallux limitus, left    Other orders  -     Small Joint Arthrocentesis      Body mass index is 26.29 kg/m².      - Patient examined and evaluated  -Imaging reviewed.  -Steroid injection left first MTP.  See procedure note above.  - All questions were answered   - RTC recheck as needed            This document has been electronically signed by Joel Mendoza DPM on June 16, 2023 16:12 CDT

## 2023-09-19 ENCOUNTER — OFFICE VISIT (OUTPATIENT)
Dept: PODIATRY | Facility: CLINIC | Age: 83
End: 2023-09-19
Payer: MEDICARE

## 2023-09-19 VITALS
HEART RATE: 68 BPM | SYSTOLIC BLOOD PRESSURE: 180 MMHG | WEIGHT: 158 LBS | BODY MASS INDEX: 26.33 KG/M2 | OXYGEN SATURATION: 97 % | HEIGHT: 65 IN | DIASTOLIC BLOOD PRESSURE: 84 MMHG

## 2023-09-19 DIAGNOSIS — L84 PRE-ULCERATIVE CALLUSES: ICD-10-CM

## 2023-09-19 DIAGNOSIS — M20.5X2 HALLUX LIMITUS, LEFT: ICD-10-CM

## 2023-09-19 DIAGNOSIS — M79.671 RIGHT FOOT PAIN: Primary | ICD-10-CM

## 2023-09-19 DIAGNOSIS — E11.42 TYPE 2 DIABETES MELLITUS WITH PERIPHERAL NEUROPATHY: ICD-10-CM

## 2023-09-19 PROCEDURE — 3077F SYST BP >= 140 MM HG: CPT | Performed by: PODIATRIST

## 2023-09-19 PROCEDURE — 3079F DIAST BP 80-89 MM HG: CPT | Performed by: PODIATRIST

## 2023-09-19 PROCEDURE — 99213 OFFICE O/P EST LOW 20 MIN: CPT | Performed by: PODIATRIST

## 2023-09-19 PROCEDURE — 1159F MED LIST DOCD IN RCRD: CPT | Performed by: PODIATRIST

## 2023-09-19 PROCEDURE — 1160F RVW MEDS BY RX/DR IN RCRD: CPT | Performed by: PODIATRIST

## 2023-09-19 NOTE — PROGRESS NOTES
Nicole Webb  1940  82 y.o. female  BS:159 per patient   PCP:Bonnie:4/10/23    2023      Chief Complaint   Patient presents with    Right Foot - Follow-up       History of Present Illness    Nicole Webb is a 82 y.o.female who presents to clinic with chief complaint of bilateral foot pain.  Pain is primary located to the left big toe joint and a callous on her right .      Past Medical History:   Diagnosis Date    CHF (congestive heart failure)     Diabetes     Hypertension     Skin cancer          Past Surgical History:   Procedure Laterality Date    APPENDECTOMY      CHOLECYSTECTOMY OPEN      CORONARY ANGIOPLASTY      CORONARY ARTERY BYPASS GRAFT      KIDNEY STONE SURGERY      PACEMAKER IMPLANTATION      SHOULDER SURGERY Right          Family History   Problem Relation Age of Onset    Heart disease Father     Hypertension Father     Diabetes Daughter     Clotting disorder Mother     Diabetes Maternal Grandmother     Clotting disorder Other        Allergies   Allergen Reactions    Codeine Diarrhea and Nausea And Vomiting    Lisinopril Cough       Social History     Socioeconomic History    Marital status:    Tobacco Use    Smoking status: Former     Types: Cigarettes     Quit date: 1979     Years since quittin.0    Smokeless tobacco: Never   Substance and Sexual Activity    Alcohol use: No         Current Outpatient Medications   Medication Sig Dispense Refill    aspirin 81 MG chewable tablet Chew 1 tablet Daily.      atorvastatin (LIPITOR) 40 MG tablet TAKE ONE TABLET BY MOUTH ONCE DAILY  1    BRILINTA 90 MG tablet tablet Take 1 tablet by mouth 2 (Two) Times a Day.  1    citalopram (CeleXA) 40 MG tablet TAKE ONE TABLET BY MOUTH ONCE DAILY  2    FLUZONE HIGH-DOSE 0.5 ML suspension prefilled syringe injection TO BE ADMINISTERED BY PHARMACIST FOR IMMUNIZATION  0    gabapentin (NEURONTIN) 300 MG capsule TAKE 1 CAPSULE EVERY NIGHT 30 capsule 0    HYDROcodone-acetaminophen  "(NORCO) 5-325 MG per tablet Take 1 tablet by mouth Every 6 (Six) Hours As Needed for Severe Pain . 12 tablet 0    isosorbide mononitrate (IMDUR) 60 MG 24 hr tablet Take 1 tablet by mouth Every Morning.  11    LEVEMIR FLEXTOUCH 100 UNIT/ML injection Inject 70 Units under the skin into the appropriate area as directed every night at bedtime.  1    losartan (COZAAR) 50 MG tablet TAKE ONE TABLET BY MOUTH ONCE DAILY  0    metFORMIN (GLUCOPHAGE) 500 MG tablet       methIMAzole (TAPAZOLE) 5 MG tablet       metoprolol succinate XL (TOPROL-XL) 50 MG 24 hr tablet       mupirocin (BACTROBAN) 2 % ointment Apply to forehead lesion and cover with bandage Apply to neck lesion twice a day 15 g 1    nitroglycerin (NITROSTAT) 0.4 MG SL tablet Place 1 tablet under the tongue.      NOVOLOG FLEXPEN 100 UNIT/ML solution pen-injector sc pen INJECT 20 UNITS SUBCUTANEOUSLY BEFORE MEALS  1    omeprazole (priLOSEC) 20 MG capsule Take 1 capsule by mouth Before Breakfast.  0    rOPINIRole (REQUIP) 0.5 MG tablet TAKE ONE TABLET BY MOUTH 1 TO 3 HOURS BEFORE BEDTIME  4     No current facility-administered medications for this visit.       Review of Systems   Constitutional: Negative.    Respiratory: Negative.     Cardiovascular: Negative.    Gastrointestinal: Negative.    Musculoskeletal:         Left foot pain    Skin: Negative.    Psychiatric/Behavioral: Negative.         OBJECTIVE    /84   Pulse 68   Ht 165.1 cm (65\")   Wt 71.7 kg (158 lb)   SpO2 97%   BMI 26.29 kg/m²     Body mass index is 26.29 kg/m².        Physical Exam  Vitals reviewed.   Constitutional:       General: She is not in acute distress.     Appearance: She is well-developed.   HENT:      Head: Normocephalic and atraumatic.      Nose: Nose normal.   Eyes:      Conjunctiva/sclera: Conjunctivae normal.      Pupils: Pupils are equal, round, and reactive to light.   Cardiovascular:      Pulses:           Dorsalis pedis pulses are 1+ on the right side and 2+ on the left " side.        Posterior tibial pulses are 1+ on the right side and 2+ on the left side.   Pulmonary:      Effort: Pulmonary effort is normal. No respiratory distress.      Breath sounds: No wheezing.   Musculoskeletal:      Right foot: Decreased range of motion. Prominent metatarsal heads present.      Left foot: Decreased range of motion. Deformity and prominent metatarsal heads present.        Feet:    Feet:      Right foot:      Skin integrity: Skin integrity normal. Callus present.      Toenail Condition: Right toenails are abnormally thick. Fungal disease present.  Skin:     General: Skin is warm and dry.      Capillary Refill: Capillary refill takes less than 2 seconds.   Neurological:      Mental Status: She is alert and oriented to person, place, and time.   Psychiatric:         Behavior: Behavior normal.         Thought Content: Thought content normal.              Procedures        ASSESSMENT AND PLAN    Diagnoses and all orders for this visit:    1. Right foot pain (Primary)  -     XR Foot 3+ View Right    2. Pre-ulcerative calluses    3. Type 2 diabetes mellitus with peripheral neuropathy    4. Hallux limitus, left      Body mass index is 26.29 kg/m².      - Patient examined and evaluated  - Imaging reviewed. No acute pathology   - dispensed offloaded surgical shoe.    - All questions were answered  - RTC 1 week            This document has been electronically signed by Joel Mendoza DPM on September 28, 2023 15:54 CDT

## 2023-09-26 ENCOUNTER — OFFICE VISIT (OUTPATIENT)
Dept: PODIATRY | Facility: CLINIC | Age: 83
End: 2023-09-26
Payer: MEDICARE

## 2023-09-26 VITALS
SYSTOLIC BLOOD PRESSURE: 120 MMHG | DIASTOLIC BLOOD PRESSURE: 80 MMHG | BODY MASS INDEX: 26.33 KG/M2 | HEIGHT: 65 IN | HEART RATE: 67 BPM | OXYGEN SATURATION: 97 % | WEIGHT: 158 LBS

## 2023-09-26 DIAGNOSIS — M21.861 ACQUIRED POSTERIOR EQUINUS, RIGHT: ICD-10-CM

## 2023-09-26 DIAGNOSIS — M79.672 LEFT FOOT PAIN: Primary | ICD-10-CM

## 2023-09-26 DIAGNOSIS — E11.42 TYPE 2 DIABETES MELLITUS WITH PERIPHERAL NEUROPATHY: ICD-10-CM

## 2023-09-26 DIAGNOSIS — L84 PRE-ULCERATIVE CALLUSES: ICD-10-CM

## 2023-09-26 DIAGNOSIS — M20.5X2 HALLUX LIMITUS, LEFT: ICD-10-CM

## 2023-09-26 PROCEDURE — 20600 DRAIN/INJ JOINT/BURSA W/O US: CPT | Performed by: PODIATRIST

## 2023-09-26 PROCEDURE — 3074F SYST BP LT 130 MM HG: CPT | Performed by: PODIATRIST

## 2023-09-26 PROCEDURE — 3079F DIAST BP 80-89 MM HG: CPT | Performed by: PODIATRIST

## 2023-09-26 PROCEDURE — 1159F MED LIST DOCD IN RCRD: CPT | Performed by: PODIATRIST

## 2023-09-26 PROCEDURE — 1160F RVW MEDS BY RX/DR IN RCRD: CPT | Performed by: PODIATRIST

## 2023-09-26 PROCEDURE — 99214 OFFICE O/P EST MOD 30 MIN: CPT | Performed by: PODIATRIST

## 2023-09-26 RX ORDER — TRIAMCINOLONE ACETONIDE 40 MG/ML
20 INJECTION, SUSPENSION INTRA-ARTICULAR; INTRAMUSCULAR ONCE
Status: COMPLETED | OUTPATIENT
Start: 2023-09-26 | End: 2023-09-26

## 2023-09-26 RX ORDER — DEXAMETHASONE SODIUM PHOSPHATE 4 MG/ML
2 INJECTION, SOLUTION INTRA-ARTICULAR; INTRALESIONAL; INTRAMUSCULAR; INTRAVENOUS; SOFT TISSUE ONCE
Status: COMPLETED | OUTPATIENT
Start: 2023-09-26 | End: 2023-09-26

## 2023-09-26 RX ADMIN — DEXAMETHASONE SODIUM PHOSPHATE 2 MG: 4 INJECTION, SOLUTION INTRA-ARTICULAR; INTRALESIONAL; INTRAMUSCULAR; INTRAVENOUS; SOFT TISSUE at 15:45

## 2023-09-26 RX ADMIN — TRIAMCINOLONE ACETONIDE 20 MG: 40 INJECTION, SUSPENSION INTRA-ARTICULAR; INTRAMUSCULAR at 15:45

## 2023-09-26 NOTE — PROGRESS NOTES
Nicole Webb  1940  82 y.o. female  BS:159 per patient   PCP:Bonnie:4/10/23    2023    Chief Complaint   Patient presents with    Left Foot - Callouses    Right Foot - Pain       History of Present Illness    Nicole Webb is a 82 y.o.female who presents to clinic for follow-up.      Past Medical History:   Diagnosis Date    CHF (congestive heart failure)     Diabetes     Hypertension     Skin cancer          Past Surgical History:   Procedure Laterality Date    APPENDECTOMY      CHOLECYSTECTOMY OPEN      CORONARY ANGIOPLASTY      CORONARY ARTERY BYPASS GRAFT      KIDNEY STONE SURGERY      PACEMAKER IMPLANTATION      SHOULDER SURGERY Right          Family History   Problem Relation Age of Onset    Heart disease Father     Hypertension Father     Diabetes Daughter     Clotting disorder Mother     Diabetes Maternal Grandmother     Clotting disorder Other        Allergies   Allergen Reactions    Codeine Diarrhea and Nausea And Vomiting    Lisinopril Cough       Social History     Socioeconomic History    Marital status:    Tobacco Use    Smoking status: Former     Types: Cigarettes     Quit date: 1979     Years since quittin.0    Smokeless tobacco: Never   Substance and Sexual Activity    Alcohol use: No         Current Outpatient Medications   Medication Sig Dispense Refill    aspirin 81 MG chewable tablet Chew 1 tablet Daily.      atorvastatin (LIPITOR) 40 MG tablet TAKE ONE TABLET BY MOUTH ONCE DAILY  1    BRILINTA 90 MG tablet tablet Take 1 tablet by mouth 2 (Two) Times a Day.  1    citalopram (CeleXA) 40 MG tablet TAKE ONE TABLET BY MOUTH ONCE DAILY  2    FLUZONE HIGH-DOSE 0.5 ML suspension prefilled syringe injection TO BE ADMINISTERED BY PHARMACIST FOR IMMUNIZATION  0    gabapentin (NEURONTIN) 300 MG capsule TAKE 1 CAPSULE EVERY NIGHT 30 capsule 0    HYDROcodone-acetaminophen (NORCO) 5-325 MG per tablet Take 1 tablet by mouth Every 6 (Six) Hours As Needed for Severe  "Pain . 12 tablet 0    isosorbide mononitrate (IMDUR) 60 MG 24 hr tablet Take 1 tablet by mouth Every Morning.  11    LEVEMIR FLEXTOUCH 100 UNIT/ML injection Inject 70 Units under the skin into the appropriate area as directed every night at bedtime.  1    losartan (COZAAR) 50 MG tablet TAKE ONE TABLET BY MOUTH ONCE DAILY  0    metFORMIN (GLUCOPHAGE) 500 MG tablet       methIMAzole (TAPAZOLE) 5 MG tablet       metoprolol succinate XL (TOPROL-XL) 50 MG 24 hr tablet       mupirocin (BACTROBAN) 2 % ointment Apply to forehead lesion and cover with bandage Apply to neck lesion twice a day 15 g 1    nitroglycerin (NITROSTAT) 0.4 MG SL tablet Place 1 tablet under the tongue.      NOVOLOG FLEXPEN 100 UNIT/ML solution pen-injector sc pen INJECT 20 UNITS SUBCUTANEOUSLY BEFORE MEALS  1    omeprazole (priLOSEC) 20 MG capsule Take 1 capsule by mouth Before Breakfast.  0    rOPINIRole (REQUIP) 0.5 MG tablet TAKE ONE TABLET BY MOUTH 1 TO 3 HOURS BEFORE BEDTIME  4     No current facility-administered medications for this visit.       Review of Systems   Constitutional: Negative.    Respiratory: Negative.     Cardiovascular: Negative.    Gastrointestinal: Negative.    Musculoskeletal:         Left foot pain    Skin: Negative.    Psychiatric/Behavioral: Negative.         OBJECTIVE    /80   Pulse 67   Ht 165.1 cm (65\")   Wt 71.7 kg (158 lb)   SpO2 97%   BMI 26.29 kg/m²     Body mass index is 26.29 kg/m².        Physical Exam  Vitals reviewed.   Constitutional:       General: She is not in acute distress.     Appearance: She is well-developed.   HENT:      Head: Normocephalic and atraumatic.      Nose: Nose normal.   Eyes:      Conjunctiva/sclera: Conjunctivae normal.      Pupils: Pupils are equal, round, and reactive to light.   Cardiovascular:      Pulses:           Dorsalis pedis pulses are 1+ on the right side and 2+ on the left side.        Posterior tibial pulses are 1+ on the right side and 2+ on the left side. "   Pulmonary:      Effort: Pulmonary effort is normal. No respiratory distress.      Breath sounds: No wheezing.   Musculoskeletal:      Right foot: Decreased range of motion. Prominent metatarsal heads present.      Left foot: Decreased range of motion. Deformity and prominent metatarsal heads present.        Feet:    Feet:      Right foot:      Skin integrity: Callus present.      Toenail Condition: Right toenails are abnormally thick. Fungal disease present.  Skin:     General: Skin is warm and dry.      Capillary Refill: Capillary refill takes less than 2 seconds.   Neurological:      Mental Status: She is alert and oriented to person, place, and time.   Psychiatric:         Behavior: Behavior normal.         Thought Content: Thought content normal.              Small Joint Arthrocentesis: L great MTP  Consent given by: patient  Site marked: site marked  Timeout: Immediately prior to procedure a time out was called to verify the correct patient, procedure, equipment, support staff and site/side marked as required   Supporting Documentation  Indications: pain   Procedure Details  Location: great toe - L great MTP          ASSESSMENT AND PLAN    Diagnoses and all orders for this visit:    1. Left foot pain (Primary)  -     dexAMETHasone (DECADRON) injection 2 mg  -     triamcinolone acetonide (KENALOG-40) injection 20 mg    2. Pre-ulcerative calluses    3. Hallux limitus, left    4. Type 2 diabetes mellitus with peripheral neuropathy    5. Acquired posterior equinus, right  -     Case Request; Standing  -     ceFAZolin (ANCEF) 2 g in sodium chloride 0.9 % 100 mL IVPB    Other orders  -     Small Joint Arthrocentesis: L great MTP  -     Follow Anesthesia Guidelines / Protocol; Future  -     Follow Anesthesia Guidelines / Protocol; Standing  -     Verify NPO Status; Standing  -     Obtain informed consent (if not collected inpatient or PAT); Standing  -     Notify Physician - Standard; Standing      Body mass index  is 26.29 kg/m².      -Steroid injection left first MTP.  See procedure note above.  -Discussed continued conservative versus surgical intervention for her preulcerative callus to the plantar first metatarsal head.  -Surgical plan is right hallux interphalangeal arthrodesis, tendo Achilles lengthening, peroneus longus tenotomy and extensor hallucis longus tendon transfer and all indicated procedures.  -Risks and benefits of the procedure including but not limited to postoperative infection, incisional dehiscence, numbness, swelling, residual pain and postoperative blood clot discussed in detail.  No guarantees were given or implied.  -Tentative date for the surgery October 9, 2023  -All questions were answered  -Recheck following surgery           This document has been electronically signed by Joel Mendoza DPM on September 28, 2023 16:00 CDT